# Patient Record
Sex: FEMALE | Race: WHITE | Employment: FULL TIME | URBAN - METROPOLITAN AREA
[De-identification: names, ages, dates, MRNs, and addresses within clinical notes are randomized per-mention and may not be internally consistent; named-entity substitution may affect disease eponyms.]

---

## 2017-03-14 ENCOUNTER — HOSPITAL ENCOUNTER (OUTPATIENT)
Age: 42
Discharge: HOME | End: 2017-03-14
Attending: EMERGENCY MEDICINE
Payer: COMMERCIAL

## 2017-03-14 VITALS
DIASTOLIC BLOOD PRESSURE: 89 MMHG | SYSTOLIC BLOOD PRESSURE: 140 MMHG | OXYGEN SATURATION: 99 % | TEMPERATURE: 98 F | HEART RATE: 77 BPM | RESPIRATION RATE: 18 BRPM | HEIGHT: 65 IN | WEIGHT: 190 LBS | BODY MASS INDEX: 31.65 KG/M2

## 2017-03-14 DIAGNOSIS — J40 BRONCHITIS: Primary | ICD-10-CM

## 2017-03-14 PROCEDURE — 99203 OFFICE O/P NEW LOW 30 MIN: CPT

## 2017-03-14 PROCEDURE — 99204 OFFICE O/P NEW MOD 45 MIN: CPT

## 2017-03-14 RX ORDER — CODEINE PHOSPHATE AND GUAIFENESIN 10; 100 MG/5ML; MG/5ML
5 SOLUTION ORAL EVERY 6 HOURS PRN
Qty: 100 ML | Refills: 0 | Status: SHIPPED | OUTPATIENT
Start: 2017-03-14 | End: 2017-11-06

## 2017-03-14 RX ORDER — ALBUTEROL SULFATE 90 UG/1
2 AEROSOL, METERED RESPIRATORY (INHALATION) EVERY 4 HOURS PRN
Qty: 1 INHALER | Refills: 0 | Status: SHIPPED | OUTPATIENT
Start: 2017-03-14 | End: 2017-04-13

## 2017-03-14 RX ORDER — AZITHROMYCIN 250 MG/1
TABLET, FILM COATED ORAL
Qty: 1 PACKAGE | Refills: 0 | Status: SHIPPED | OUTPATIENT
Start: 2017-03-14 | End: 2017-11-06 | Stop reason: ALTCHOICE

## 2017-03-14 RX ORDER — GUAIFENESIN 600 MG
1200 TABLET, EXTENDED RELEASE 12 HR ORAL 2 TIMES DAILY
COMMUNITY
End: 2017-11-06

## 2017-03-14 NOTE — ED PROVIDER NOTES
CC:Cough    HPI:     Eliezer Smith is a 37year old female who presents for evaluation of a chief complaint of a cough. Onset of symptoms was over 10 days ago.  The patient also complains of having abnormal breath sounds which are audible and being unabl Inhalation Aero Soln   Sig: Inhale 2 puffs into the lungs every 4 (four) hours as needed for Wheezing or Shortness of Breath.    Dispense:  1 Inhaler   Refill:  0  Spacer/Aero Chamber Mouthpiece Does not apply Misc   Sig: Use with inhaler   Dispense:  1 eac

## 2017-03-14 NOTE — ED INITIAL ASSESSMENT (HPI)
Pt presents to the IC with c/o cough for approx 10-12 days. Pt notes \"i don't feel sick\". No SOB, fever, nasal congestion, sore throat or ear pain. Non-productive, dry cough. Pt notes her  was just sick and now she has the same.  +difficulty sleepi

## 2017-03-15 PROBLEM — D25.9 UTERINE LEIOMYOMA, UNSPECIFIED LOCATION: Status: ACTIVE | Noted: 2017-03-15

## 2017-11-06 PROCEDURE — 88175 CYTOPATH C/V AUTO FLUID REDO: CPT | Performed by: OBSTETRICS & GYNECOLOGY

## 2017-11-06 PROCEDURE — 87624 HPV HI-RISK TYP POOLED RSLT: CPT | Performed by: OBSTETRICS & GYNECOLOGY

## 2019-01-07 ENCOUNTER — HOSPITAL ENCOUNTER (OUTPATIENT)
Age: 44
Discharge: HOME | End: 2019-01-07
Attending: EMERGENCY MEDICINE
Payer: COMMERCIAL

## 2019-01-07 VITALS
TEMPERATURE: 98 F | DIASTOLIC BLOOD PRESSURE: 77 MMHG | RESPIRATION RATE: 20 BRPM | HEART RATE: 86 BPM | OXYGEN SATURATION: 100 % | HEIGHT: 65 IN | BODY MASS INDEX: 32.49 KG/M2 | WEIGHT: 195 LBS | SYSTOLIC BLOOD PRESSURE: 145 MMHG

## 2019-01-07 DIAGNOSIS — J40 BRONCHITIS: Primary | ICD-10-CM

## 2019-01-07 PROCEDURE — 99213 OFFICE O/P EST LOW 20 MIN: CPT

## 2019-01-07 PROCEDURE — 99214 OFFICE O/P EST MOD 30 MIN: CPT

## 2019-01-07 RX ORDER — BENZONATATE 100 MG/1
100 CAPSULE ORAL 3 TIMES DAILY PRN
Qty: 30 CAPSULE | Refills: 0 | Status: SHIPPED | OUTPATIENT
Start: 2019-01-07 | End: 2019-02-06

## 2019-01-07 RX ORDER — FLUTICASONE PROPIONATE 50 MCG
2 SPRAY, SUSPENSION (ML) NASAL DAILY
Qty: 16 G | Refills: 0 | Status: SHIPPED | OUTPATIENT
Start: 2019-01-07 | End: 2019-02-06

## 2019-01-07 RX ORDER — ALBUTEROL SULFATE 90 UG/1
2 AEROSOL, METERED RESPIRATORY (INHALATION) EVERY 4 HOURS PRN
Qty: 1 INHALER | Refills: 0 | Status: SHIPPED | OUTPATIENT
Start: 2019-01-07 | End: 2019-02-06

## 2019-01-07 NOTE — ED INITIAL ASSESSMENT (HPI)
Patient is here with a congested cough that she states has been going on for the last 10 days. She states it keeps her up at night.

## 2019-04-03 ENCOUNTER — OFFICE VISIT (OUTPATIENT)
Dept: UROGYNECOLOGY | Age: 44
End: 2019-04-03

## 2019-04-03 DIAGNOSIS — D25.2 INTRAMURAL AND SUBSEROUS LEIOMYOMA OF UTERUS: ICD-10-CM

## 2019-04-03 DIAGNOSIS — R35.0 FREQUENCY OF URINATION: Primary | ICD-10-CM

## 2019-04-03 DIAGNOSIS — R10.2 PELVIC PAIN IN FEMALE: ICD-10-CM

## 2019-04-03 DIAGNOSIS — D25.1 INTRAMURAL AND SUBSEROUS LEIOMYOMA OF UTERUS: ICD-10-CM

## 2019-04-03 LAB
APPEARANCE, POC: CLEAR
BILIRUBIN, POC: NEGATIVE
COLOR, POC: YELLOW
GLUCOSE UR-MCNC: NEGATIVE MG/DL
KETONES, POC: NEGATIVE
NITRITE, POC: NEGATIVE
OCCULT BLOOD, POC: NEGATIVE
PH UR: 5.5 [PH] (ref 5–7)
PROT UR-MCNC: NEGATIVE G/DL
SP GR UR: 1.02 (ref 1–1.03)
UROBILINOGEN UR-MCNC: 0.2 MG/DL (ref 0–1)
WBC (LEUKOCYTE) ESTERASE, POC: NORMAL

## 2019-04-03 PROCEDURE — 76856 US EXAM PELVIC COMPLETE: CPT | Performed by: OBSTETRICS & GYNECOLOGY

## 2019-04-03 PROCEDURE — 99204 OFFICE O/P NEW MOD 45 MIN: CPT | Performed by: OBSTETRICS & GYNECOLOGY

## 2019-04-03 PROCEDURE — 76830 TRANSVAGINAL US NON-OB: CPT | Performed by: OBSTETRICS & GYNECOLOGY

## 2019-04-03 ASSESSMENT — PAIN SCALES - GENERAL: PAINLEVEL: 0

## 2019-04-05 ENCOUNTER — OFF PREMISE (OUTPATIENT)
Dept: UROGYNECOLOGY | Age: 44
End: 2019-04-05

## 2019-05-07 ENCOUNTER — HOSPITAL ENCOUNTER (OUTPATIENT)
Age: 44
Discharge: HOME | End: 2019-05-07
Attending: EMERGENCY MEDICINE
Payer: COMMERCIAL

## 2019-05-07 VITALS
SYSTOLIC BLOOD PRESSURE: 140 MMHG | OXYGEN SATURATION: 99 % | HEART RATE: 68 BPM | TEMPERATURE: 98 F | RESPIRATION RATE: 16 BRPM | DIASTOLIC BLOOD PRESSURE: 72 MMHG

## 2019-05-07 DIAGNOSIS — B34.9 VIRAL ILLNESS: Primary | ICD-10-CM

## 2019-05-07 PROCEDURE — 99212 OFFICE O/P EST SF 10 MIN: CPT

## 2019-05-07 PROCEDURE — 99213 OFFICE O/P EST LOW 20 MIN: CPT

## 2019-05-07 NOTE — ED INITIAL ASSESSMENT (HPI)
Pt presents to the IC with c/o headaches, stiff back, body aches, and chills. No known fevers. Pt has tried OTC medications without relief. Pt notes high amounts of stress.

## 2019-05-07 NOTE — ED PROVIDER NOTES
Patient Seen in: [address]    History   Patient presents with:  Cough/URI    Stated Complaint: bodyaches, chills    HPI    Patient is a healthy 43-year-old female who presents to immediate care complaining of 2 days of Neurological: Positive for light-headedness. Negative for dizziness, weakness and numbness. Positive for stated complaint: bodyaches, chills  Other systems are as noted in HPI. Constitutional and vital signs reviewed.       All other systems review

## 2019-05-15 ENCOUNTER — APPOINTMENT (OUTPATIENT)
Dept: CT IMAGING | Facility: HOSPITAL | Age: 44
End: 2019-05-15
Attending: EMERGENCY MEDICINE
Payer: COMMERCIAL

## 2019-05-15 ENCOUNTER — HOSPITAL ENCOUNTER (EMERGENCY)
Facility: HOSPITAL | Age: 44
Discharge: HOME | End: 2019-05-15
Attending: EMERGENCY MEDICINE
Payer: COMMERCIAL

## 2019-05-15 ENCOUNTER — HOSPITAL ENCOUNTER (OUTPATIENT)
Age: 44
Discharge: ANOTHER HOSPITAL | End: 2019-05-15
Attending: EMERGENCY MEDICINE
Payer: COMMERCIAL

## 2019-05-15 VITALS
SYSTOLIC BLOOD PRESSURE: 125 MMHG | RESPIRATION RATE: 18 BRPM | HEART RATE: 85 BPM | DIASTOLIC BLOOD PRESSURE: 71 MMHG | TEMPERATURE: 98 F | OXYGEN SATURATION: 98 %

## 2019-05-15 VITALS
HEART RATE: 88 BPM | RESPIRATION RATE: 20 BRPM | WEIGHT: 198 LBS | DIASTOLIC BLOOD PRESSURE: 66 MMHG | SYSTOLIC BLOOD PRESSURE: 127 MMHG | HEIGHT: 65 IN | BODY MASS INDEX: 32.99 KG/M2 | OXYGEN SATURATION: 99 % | TEMPERATURE: 99 F

## 2019-05-15 DIAGNOSIS — R10.9 ABDOMINAL PAIN OF UNKNOWN ETIOLOGY: Primary | ICD-10-CM

## 2019-05-15 DIAGNOSIS — R10.31 RLQ ABDOMINAL PAIN: Primary | ICD-10-CM

## 2019-05-15 DIAGNOSIS — D72.829 LEUKOCYTOSIS, UNSPECIFIED TYPE: ICD-10-CM

## 2019-05-15 DIAGNOSIS — N83.201 CYST OF RIGHT OVARY: ICD-10-CM

## 2019-05-15 DIAGNOSIS — K76.9 LIVER LESION: ICD-10-CM

## 2019-05-15 PROCEDURE — 99212 OFFICE O/P EST SF 10 MIN: CPT

## 2019-05-15 PROCEDURE — 74177 CT ABD & PELVIS W/CONTRAST: CPT | Performed by: EMERGENCY MEDICINE

## 2019-05-15 PROCEDURE — 81001 URINALYSIS AUTO W/SCOPE: CPT | Performed by: EMERGENCY MEDICINE

## 2019-05-15 PROCEDURE — 81025 URINE PREGNANCY TEST: CPT

## 2019-05-15 PROCEDURE — 96360 HYDRATION IV INFUSION INIT: CPT

## 2019-05-15 PROCEDURE — 80048 BASIC METABOLIC PNL TOTAL CA: CPT | Performed by: EMERGENCY MEDICINE

## 2019-05-15 PROCEDURE — 85025 COMPLETE CBC W/AUTO DIFF WBC: CPT | Performed by: EMERGENCY MEDICINE

## 2019-05-15 PROCEDURE — 99284 EMERGENCY DEPT VISIT MOD MDM: CPT

## 2019-05-15 NOTE — ED INITIAL ASSESSMENT (HPI)
Pt presents to the IC with c/o acute onset of RLQ abd pain at 0930. Pt notes nausea without emesis. +diarrhea. Pt was seen last week after having n/v for 3 days and dx with gastroenteritis. No fevers today.

## 2019-05-15 NOTE — ED PROVIDER NOTES
Patient Seen in: [address]    History   Patient presents with:  Abdomen/Flank Pain (GI/)    Stated Complaint: appendix    HPI    Patient complains of RLQ PAIN abdominal pain that began 10AM.  Pain described as stabbin Current:/66   Pulse 88   Temp 99.2 °F (37.3 °C) (Oral)   Resp 20   Ht 165.1 cm (5' 5\")   Wt 89.8 kg   LMP 04/23/2019   SpO2 99%   BMI 32.95 kg/m²   Pulse ox         Physical Exam  General Appearance: alert, no distress  Eyes: pupils equal and

## 2019-05-16 NOTE — ED NOTES
Discharge instructions given to patient. Patient states understanding. Patient alert and oriented leaving with family. Instructed patient to follow up with MD listed on discharge papers, or return to ED if symptoms worsen.

## 2019-05-16 NOTE — ED NOTES
Patient c/o of lower right side of abdomen and radiates to lower back. Patient states had soft bowel movement this morning does not feel it was diarrhea. Patient appears comfort while in bed, but states may have shooting pain. Also pain upon palpation.  Luz

## 2019-05-16 NOTE — ED PROVIDER NOTES
Patient Seen in: [facility] AND [facility]    History   Patient presents with:  Abdomen/Flank Pain (GI/)    Stated Complaint: abd pain    HPI    81-year-old female presents for complaint of right lower quadrant abdominal pain since this mo Temp src Oral   SpO2 100 %   O2 Device None (Room air)       Current:/71   Pulse 95   Temp 98.1 °F (36.7 °C) (Oral)   Resp 16   LMP 04/23/2019   SpO2 98%         Physical Exam   Constitutional: She is oriented to person, place, and time.  She appear results for these tests on the individual orders. RAINBOW DRAW BLUE   RAINBOW DRAW LAVENDER   RAINBOW DRAW LIGHT GREEN   RAINBOW DRAW GOLD                MDM    CBC with leukocytosis. BMP unremarkable. Urinalysis without any evidence of infection.   CT represent cysts. No enhancing renal lesion or hydronephrosis. AORTA/VASCULAR:   Retroaortic left renal vein. No abdominal aortic aneurysm. RETROPERITONEUM: No mass or enlarged adenopathy. BOWEL/MESENTERY:  There is no small or large bowel obstruction.  Dameon Mooney or other hepatic focus. Nonemergent MRI  abdomen with and without contrast is recommended to further characterize this finding. 3.  Retroaortic left renal vein, normal anatomic variant.    Dictated by (CST): Hasmukh Hayes MD on 5/15/2019 at 21:09     Approv

## 2019-05-17 ENCOUNTER — OFFICE VISIT (OUTPATIENT)
Dept: INTERNAL MEDICINE CLINIC | Facility: CLINIC | Age: 44
End: 2019-05-17
Payer: COMMERCIAL

## 2019-05-17 VITALS
HEIGHT: 66 IN | WEIGHT: 207 LBS | BODY MASS INDEX: 33.27 KG/M2 | SYSTOLIC BLOOD PRESSURE: 130 MMHG | DIASTOLIC BLOOD PRESSURE: 82 MMHG | HEART RATE: 82 BPM

## 2019-05-17 DIAGNOSIS — K76.9 LIVER LESION, RIGHT LOBE: ICD-10-CM

## 2019-05-17 DIAGNOSIS — R10.31 RIGHT LOWER QUADRANT ABDOMINAL PAIN: Primary | ICD-10-CM

## 2019-05-17 PROCEDURE — 99212 OFFICE O/P EST SF 10 MIN: CPT | Performed by: INTERNAL MEDICINE

## 2019-05-17 PROCEDURE — 99202 OFFICE O/P NEW SF 15 MIN: CPT | Performed by: INTERNAL MEDICINE

## 2019-05-17 NOTE — PROGRESS NOTES
David Henry is a 44year old female. Patient presents with:  ER F/U: Was seen in [facility] ER on 5/15 due to lower right abdominal pain. HPI:   Ms. Bessie Foley presents this morning for ER follow-up.     On Wednesday, 2 days ago, she developed low-grade f Performed by Antonieta Cole MD at [place] [address]   • LASIK  2004   • OTHER SURGICAL HISTORY      billat knee arthroscopys more than 10 years/ right tennis elbow release 7 yrs ago/ lasix 2004   • OTHER SURGICAL HISTORY Right 12/2010    rightr elbow surg

## 2019-05-17 NOTE — PATIENT INSTRUCTIONS
Please schedule an MRI of your abdomen. Await results of pelvic ultrasound and follow-up with Gynecology.

## 2019-05-23 ENCOUNTER — TELEPHONE (OUTPATIENT)
Dept: OTHER | Age: 44
End: 2019-05-23

## 2019-05-23 NOTE — TELEPHONE ENCOUNTER
Pt would like to know if mri results received. Done on 5/21/19. LMTCB to see where Mri was done.   Please advise

## 2019-05-23 NOTE — TELEPHONE ENCOUNTER
Patient returned call and stated she completed MRI at [facility] in [facility]. [address] number for [facility] is [phone] per patient.

## 2019-05-24 NOTE — TELEPHONE ENCOUNTER
[facility] and requested for MRI report to [phone].   Rec'd report and placed on desk for review

## 2019-05-30 ENCOUNTER — TELEPHONE (OUTPATIENT)
Dept: GASTROENTEROLOGY | Facility: CLINIC | Age: 44
End: 2019-05-30

## 2019-05-30 ENCOUNTER — OFFICE VISIT (OUTPATIENT)
Dept: GASTROENTEROLOGY | Facility: CLINIC | Age: 44
End: 2019-05-30
Payer: COMMERCIAL

## 2019-05-30 VITALS
DIASTOLIC BLOOD PRESSURE: 87 MMHG | HEART RATE: 83 BPM | HEIGHT: 66 IN | BODY MASS INDEX: 33.05 KG/M2 | WEIGHT: 205.63 LBS | SYSTOLIC BLOOD PRESSURE: 124 MMHG

## 2019-05-30 DIAGNOSIS — R93.89 ABNORMAL FINDING ON CT SCAN: Primary | ICD-10-CM

## 2019-05-30 DIAGNOSIS — K76.9 LIVER LESION: ICD-10-CM

## 2019-05-30 PROCEDURE — 99203 OFFICE O/P NEW LOW 30 MIN: CPT | Performed by: NURSE PRACTITIONER

## 2019-05-30 PROCEDURE — 99212 OFFICE O/P EST SF 10 MIN: CPT | Performed by: NURSE PRACTITIONER

## 2019-05-30 NOTE — TELEPHONE ENCOUNTER
Pt in office with Natasha SALAS today, and brought CD of MRI abdomen done at [place] [address] in [facility] 5/21/19. Natasha Thompson requests that CD be taken to [facility] Rec file room to have scanned to computer, AND have our Radiologist read asap.   I spoke

## 2019-05-30 NOTE — H&P
[address] [facility] [address] Gastroenterology                                                                                                  Clinic History and Physical     Pa history of IBD.     Prior endoscopies:  Denies    Social Hx:  - No smoking  + Social Etoh  - Denies illicit drug use   - LMP: Perimenopausal  - Occupation:   - Lives with spouse  - NSAIDs/ASA use: PRN      History, Medications, Allergies, ROS: Blood pressure 124/87, pulse 83, height 5' 6\" (1.676 m), weight 205 lb 9.6 oz (93.3 kg).     Gen: patient appears comfortable and in no acute distress  HEENT: conjunctiva pink, the sclera appears anicteric, oropharynx clear, mucus membranes appear moist encounter. Meds This Visit:  Requested Prescriptions      No prescriptions requested or ordered in this encounter       Imaging & Referrals:  None       MARITZA Mcduffie  5/30/2019

## 2019-05-31 ENCOUNTER — MED REC SCAN ONLY (OUTPATIENT)
Dept: INTERNAL MEDICINE CLINIC | Facility: CLINIC | Age: 44
End: 2019-05-31

## 2019-06-05 ENCOUNTER — PATIENT MESSAGE (OUTPATIENT)
Dept: GASTROENTEROLOGY | Facility: CLINIC | Age: 44
End: 2019-06-05

## 2019-06-05 DIAGNOSIS — K76.9 LIVER LESION: Primary | ICD-10-CM

## 2019-06-05 NOTE — PROGRESS NOTES
Patient notified she can obtain MRI from contacting [phone] where she had imaging done in [facility]. Also patient states she already made appt with Dr Koko Mahoney at [place] and will have labs done tomorrow.      [id number]

## 2019-06-05 NOTE — TELEPHONE ENCOUNTER
From: Cam Perez  To: CHENCHO Mcclelland  Sent: 6/5/2019 4:06 PM CDT  Subject: Test Results Question    Wayne Elizalde,     Thank you for your detailed message today, much appreciated.  I have the referral names but not sure how to go about getting

## 2019-06-05 NOTE — TELEPHONE ENCOUNTER
From: Prudence Shall  To: CHENCHO Elliott  Sent: 6/5/2019 12:38 PM CDT  Subject: Visit Follow-up Question    Orestes De Oliveira-  Thank you for your time last week. Just a few update as we are waiting for the third-party MRI read/scan:   1.  My PCP reran

## 2019-06-05 NOTE — TELEPHONE ENCOUNTER
Pt called back and was given 2 drs name and which facility they work out of per [employer] and was transferred to medical records so she can obtain a copy of MRI

## 2019-06-05 NOTE — PROGRESS NOTES
Nursing: I left the patient a very detailed voicemail. I was able to review the outside MRI report that is scanned into the patient's chart.   This report indicates a indeterminant solid lesion in the right lobe of the liver that is not typical of a dmitry

## 2019-06-06 ENCOUNTER — APPOINTMENT (OUTPATIENT)
Dept: LAB | Facility: HOSPITAL | Age: 44
End: 2019-06-06
Attending: NURSE PRACTITIONER
Payer: COMMERCIAL

## 2019-06-06 DIAGNOSIS — K76.9 LIVER LESION: ICD-10-CM

## 2019-06-06 PROCEDURE — 82105 ALPHA-FETOPROTEIN SERUM: CPT

## 2019-06-06 PROCEDURE — 80076 HEPATIC FUNCTION PANEL: CPT

## 2019-06-06 PROCEDURE — 36415 COLL VENOUS BLD VENIPUNCTURE: CPT

## 2019-06-06 PROCEDURE — 82378 CARCINOEMBRYONIC ANTIGEN: CPT

## 2019-06-14 ENCOUNTER — TELEPHONE (OUTPATIENT)
Dept: GASTROENTEROLOGY | Facility: CLINIC | Age: 44
End: 2019-06-14

## 2019-06-24 NOTE — TELEPHONE ENCOUNTER
[address]-  We received note from Dr Migel Cruz these have been placed on your desk for review.

## 2019-06-25 NOTE — TELEPHONE ENCOUNTER
Noted and appreciated. I have reviewed Dr. Pace Bolus note. He plans to f/u w/ pt in 1 month, review imaging, possibly consider Fibroscan pending results.      OV notes sent to scanning

## 2019-06-26 ENCOUNTER — HOSPITAL (OUTPATIENT)
Dept: OTHER | Age: 44
End: 2019-06-26

## 2019-06-28 LAB — PATHOLOGIST NAME: NORMAL

## 2019-09-11 ENCOUNTER — HOSPITAL (OUTPATIENT)
Dept: OTHER | Age: 44
End: 2019-09-11
Attending: OBSTETRICS & GYNECOLOGY

## 2020-03-13 PROBLEM — O09.811 PREGNANCY RESULTING FROM ASSISTED REPRODUCTIVE TECHNOLOGY IN FIRST TRIMESTER: Status: ACTIVE | Noted: 2020-03-13

## 2020-04-22 ENCOUNTER — HOSPITAL ENCOUNTER (OUTPATIENT)
Dept: PERINATAL CARE | Facility: HOSPITAL | Age: 45
Discharge: HOME | End: 2020-04-22
Attending: OBSTETRICS & GYNECOLOGY
Payer: COMMERCIAL

## 2020-04-22 VITALS
HEART RATE: 97 BPM | WEIGHT: 197 LBS | DIASTOLIC BLOOD PRESSURE: 78 MMHG | BODY MASS INDEX: 33 KG/M2 | SYSTOLIC BLOOD PRESSURE: 126 MMHG

## 2020-04-22 DIAGNOSIS — O09.811 PREGNANCY RESULTING FROM ASSISTED REPRODUCTIVE TECHNOLOGY IN FIRST TRIMESTER: ICD-10-CM

## 2020-04-22 DIAGNOSIS — O09.521 AMA (ADVANCED MATERNAL AGE) MULTIGRAVIDA 35+, FIRST TRIMESTER: ICD-10-CM

## 2020-04-22 DIAGNOSIS — D25.9 UTERINE LEIOMYOMA, UNSPECIFIED LOCATION: ICD-10-CM

## 2020-04-22 DIAGNOSIS — Z36.9 FIRST TRIMESTER SCREENING: ICD-10-CM

## 2020-04-22 DIAGNOSIS — O09.811 PREGNANCY RESULTING FROM ASSISTED REPRODUCTIVE TECHNOLOGY IN FIRST TRIMESTER: Primary | ICD-10-CM

## 2020-04-22 PROBLEM — O09.529 AMA (ADVANCED MATERNAL AGE) MULTIGRAVIDA 35+: Status: ACTIVE | Noted: 2020-04-22

## 2020-04-22 PROCEDURE — 76813 OB US NUCHAL MEAS 1 GEST: CPT | Performed by: OBSTETRICS & GYNECOLOGY

## 2020-04-22 PROCEDURE — 99243 OFF/OP CNSLTJ NEW/EST LOW 30: CPT | Performed by: OBSTETRICS & GYNECOLOGY

## 2020-04-22 NOTE — PROGRESS NOTES
Reason for Consult:   Dear Dr. Anna Lopez,    Thank you for requesting ultrasound evaluation and maternal fetal medicine consultation on Chilo Dennis. As you are aware she is a 38year old female with a Link pregnancy at 12w3d.   A maternal-feta NARRATIVE:     /78   Pulse 97   Wt 197 lb (89.4 kg)   LMP 01/26/2020 (Exact Date)   BMI 32.78 kg/m²           Alert and Oriented. No acute distress          Abdomen:  soft, nontender, no contractions noted.            extremities:  nontender, no age or older are more likely to be delivered by .  The  delivery rate in the general obstetric population of the [facility] is almost 30 percent, compared to almost 48 percent in women over age 36 to 39 and almost [de-identified] percent in women age age at age 37year old. She understands that ultrasound exam cannot exclude potential genetic abnormalities. Her estimated risk based on maternal age at amniocentesis with any chromosome abnormality is about 1:20  and with Down Syndrome is about 1: 27. trimester prior to the onset of labor. Despite these concerns, the nine studies describing results of pregnancy following laparoscopic myomectomy reported only one uterine rupture in 211 deliveries.     I recommend elective  delivery prior to the on

## 2020-05-01 PROBLEM — Z14.1 CYSTIC FIBROSIS CARRIER: Status: ACTIVE | Noted: 2020-05-01

## 2020-05-19 PROBLEM — O09.512 AMA (ADVANCED MATERNAL AGE) PRIMIGRAVIDA 35+, SECOND TRIMESTER: Status: ACTIVE | Noted: 2020-04-22

## 2020-06-24 ENCOUNTER — HOSPITAL ENCOUNTER (OUTPATIENT)
Dept: PERINATAL CARE | Facility: HOSPITAL | Age: 45
Discharge: HOME | End: 2020-06-24
Attending: OBSTETRICS & GYNECOLOGY
Payer: COMMERCIAL

## 2020-06-24 VITALS
DIASTOLIC BLOOD PRESSURE: 67 MMHG | HEART RATE: 102 BPM | BODY MASS INDEX: 33 KG/M2 | WEIGHT: 201 LBS | SYSTOLIC BLOOD PRESSURE: 106 MMHG

## 2020-06-24 DIAGNOSIS — O09.512 AMA (ADVANCED MATERNAL AGE) PRIMIGRAVIDA 35+, SECOND TRIMESTER: Primary | ICD-10-CM

## 2020-06-24 DIAGNOSIS — D25.9 UTERINE LEIOMYOMA, UNSPECIFIED LOCATION: ICD-10-CM

## 2020-06-24 DIAGNOSIS — O09.512 AMA (ADVANCED MATERNAL AGE) PRIMIGRAVIDA 35+, SECOND TRIMESTER: ICD-10-CM

## 2020-06-24 PROCEDURE — 76811 OB US DETAILED SNGL FETUS: CPT | Performed by: OBSTETRICS & GYNECOLOGY

## 2020-06-24 PROCEDURE — 99214 OFFICE O/P EST MOD 30 MIN: CPT | Performed by: OBSTETRICS & GYNECOLOGY

## 2020-06-24 NOTE — PROGRESS NOTES
Reason for Consult:   Dear Dr. Quinn Hahn,    Thank you for requesting ultrasound evaluation and maternal fetal medicine consultation on Joby Laguerre. As you are aware she is a 38year old female with a Link pregnancy.   A maternal-fetal medicin Alcohol/week: 0.0 standard drinks      Comment: social    Drug use: No       NARRATIVE:     /67   Pulse 102   Wt 201 lb (91.2 kg)   LMP 01/26/2020 (Exact Date)   BMI 33.45 kg/m²           Alert and Oriented.   No acute distress          Abdomen:  s prior to the onset of labor IF the uterine cavity was entered during a prior myomectomy or IF a large number of myomas were removed. Even if the uterine cavity was not violated, the patient is likely to still have an increased risk of uterine rupture. 37-38 weeks due myomectomy    Thank you for allowing me to participate in the care of your patient. Please do not hesitate to call with any questions or concerns. Total patient time was 35 minutes in evaluation, consultation, and coordination of care.

## 2020-07-20 PROBLEM — Z98.891 HISTORY OF UTERINE SCAR DUE TO PREVIOUS SURGERY: Status: ACTIVE | Noted: 2017-03-15

## 2020-08-06 NOTE — PROGRESS NOTES
Ronal Frances    Dear Dr. Nagi Cotton    Thank you for requesting ultrasound evaluation and maternal fetal medicine consultation on your patient Laureen Duenas.   As you are aware she is a 38year old female  with a sing see previous MFM detailed discussion.       Background  I reviewed with the patient that pregnancies in women of advanced maternal age (28 or older at delivery) are associated with elevated risks.  Specifically, there is a higher rate of:  · Fetal malformat like us to manage her GDM, then please request our office to manage the GDM. Insulin therapy may be started depending on her blood sugar levels. IMPRESSION:  · IUP at 28w2d   · AMA- [place] test low risk.   · H/o myomectomy      RECOMMENDATIONS:

## 2020-08-11 ENCOUNTER — HOSPITAL ENCOUNTER (OUTPATIENT)
Dept: PERINATAL CARE | Facility: HOSPITAL | Age: 45
Discharge: HOME | End: 2020-08-11
Attending: OBSTETRICS & GYNECOLOGY
Payer: COMMERCIAL

## 2020-08-11 VITALS
SYSTOLIC BLOOD PRESSURE: 118 MMHG | WEIGHT: 204 LBS | DIASTOLIC BLOOD PRESSURE: 78 MMHG | HEART RATE: 97 BPM | BODY MASS INDEX: 34 KG/M2

## 2020-08-11 DIAGNOSIS — O09.512 AMA (ADVANCED MATERNAL AGE) PRIMIGRAVIDA 35+, SECOND TRIMESTER: ICD-10-CM

## 2020-08-11 DIAGNOSIS — O09.811 PREGNANCY RESULTING FROM ASSISTED REPRODUCTIVE TECHNOLOGY IN FIRST TRIMESTER: ICD-10-CM

## 2020-08-11 DIAGNOSIS — O09.811 PREGNANCY RESULTING FROM ASSISTED REPRODUCTIVE TECHNOLOGY IN FIRST TRIMESTER: Primary | ICD-10-CM

## 2020-08-11 DIAGNOSIS — O24.410 DIET CONTROLLED GESTATIONAL DIABETES MELLITUS (GDM) IN THIRD TRIMESTER: ICD-10-CM

## 2020-08-11 DIAGNOSIS — O34.29 PREGNANCY WITH HISTORY OF UTERINE MYOMECTOMY: ICD-10-CM

## 2020-08-11 PROCEDURE — 76816 OB US FOLLOW-UP PER FETUS: CPT | Performed by: OBSTETRICS & GYNECOLOGY

## 2020-08-11 PROCEDURE — 99213 OFFICE O/P EST LOW 20 MIN: CPT | Performed by: OBSTETRICS & GYNECOLOGY

## 2020-08-11 NOTE — PROGRESS NOTES
Pt for Growth US  HX FAILED 1 HOUR GTT PT TO DE  8/11/2020  Denies pregnancy complaints  States active fetus

## 2020-08-12 PROBLEM — O24.410 DIET CONTROLLED GESTATIONAL DIABETES MELLITUS (GDM) IN THIRD TRIMESTER: Status: ACTIVE | Noted: 2020-08-12

## 2020-08-19 PROBLEM — O09.523 SUPERVISION OF HIGH RISK ELDERLY MULTIGRAVIDA IN THIRD TRIMESTER: Status: ACTIVE | Noted: 2020-03-13

## 2020-09-01 ENCOUNTER — HOSPITAL ENCOUNTER (OUTPATIENT)
Dept: PERINATAL CARE | Facility: HOSPITAL | Age: 45
Discharge: HOME | End: 2020-09-01
Attending: OBSTETRICS & GYNECOLOGY
Payer: COMMERCIAL

## 2020-09-01 VITALS
WEIGHT: 201 LBS | BODY MASS INDEX: 33 KG/M2 | HEART RATE: 103 BPM | SYSTOLIC BLOOD PRESSURE: 128 MMHG | DIASTOLIC BLOOD PRESSURE: 70 MMHG

## 2020-09-01 DIAGNOSIS — O09.512 AMA (ADVANCED MATERNAL AGE) PRIMIGRAVIDA 35+, SECOND TRIMESTER: ICD-10-CM

## 2020-09-01 DIAGNOSIS — O34.29 PREGNANCY WITH HISTORY OF UTERINE MYOMECTOMY: ICD-10-CM

## 2020-09-01 DIAGNOSIS — O24.410 DIET CONTROLLED GESTATIONAL DIABETES MELLITUS (GDM) IN THIRD TRIMESTER: ICD-10-CM

## 2020-09-01 PROCEDURE — 99215 OFFICE O/P EST HI 40 MIN: CPT | Performed by: OBSTETRICS & GYNECOLOGY

## 2020-09-01 NOTE — PROGRESS NOTES
Reason for Consult:   Dear Dr. Anitha Werner,    Thank you for requesting maternal fetal medicine consultation on Sara Iraheta. As you are aware she is a 41year old female with a Link pregnancy.   A maternal-fetal medicine consultation was request yrs ago/ lasix 2004   • OTHER SURGICAL HISTORY Right 12/2010    rightr elbow surgery    • OTHER SURGICAL HISTORY  2019    laparoscopic myomectomy-Dr Isi Brooks   • UTERINE FIBROID EMBOLIZATION PERQ W/RAD GID  05/31/2017      Family History   Problem Relation A trimester; NST weekly by about 32 weeks and increase to twice weekly by 36 weeks. She will be testing her blood sugars at fasting and 2 hour postprandially. Fasting blood glucose should be less than 95 and two hour postprandial <120.

## 2020-09-04 DIAGNOSIS — O24.419 GESTATIONAL DIABETES MELLITUS: Primary | ICD-10-CM

## 2020-09-09 ENCOUNTER — HOSPITAL ENCOUNTER (OUTPATIENT)
Dept: PERINATAL CARE | Facility: HOSPITAL | Age: 45
Discharge: HOME | End: 2020-09-09
Attending: OBSTETRICS & GYNECOLOGY
Payer: COMMERCIAL

## 2020-09-09 VITALS
HEART RATE: 110 BPM | WEIGHT: 201 LBS | SYSTOLIC BLOOD PRESSURE: 129 MMHG | BODY MASS INDEX: 33 KG/M2 | DIASTOLIC BLOOD PRESSURE: 81 MMHG

## 2020-09-09 DIAGNOSIS — O24.410 DIET CONTROLLED GESTATIONAL DIABETES MELLITUS (GDM) IN THIRD TRIMESTER: ICD-10-CM

## 2020-09-09 DIAGNOSIS — O09.523 AMA (ADVANCED MATERNAL AGE) MULTIGRAVIDA 35+, THIRD TRIMESTER: ICD-10-CM

## 2020-09-09 DIAGNOSIS — O09.523 AMA (ADVANCED MATERNAL AGE) MULTIGRAVIDA 35+, THIRD TRIMESTER: Primary | ICD-10-CM

## 2020-09-09 DIAGNOSIS — O34.29 PREGNANCY WITH HISTORY OF UTERINE MYOMECTOMY: ICD-10-CM

## 2020-09-09 DIAGNOSIS — Z14.1 CYSTIC FIBROSIS CARRIER: ICD-10-CM

## 2020-09-09 PROCEDURE — 99214 OFFICE O/P EST MOD 30 MIN: CPT | Performed by: OBSTETRICS & GYNECOLOGY

## 2020-09-09 PROCEDURE — 76819 FETAL BIOPHYS PROFIL W/O NST: CPT | Performed by: OBSTETRICS & GYNECOLOGY

## 2020-09-09 PROCEDURE — 76816 OB US FOLLOW-UP PER FETUS: CPT | Performed by: OBSTETRICS & GYNECOLOGY

## 2020-09-09 NOTE — PROGRESS NOTES
Reason for Consult:   Dear Dr. Sonal Cespedes,    Thank you for requesting maternal fetal medicine consultation and ultrasound on Chrissy Bunn. As you are aware she is a 41year old female with a Link pregnancy.   A maternal-fetal medicine consultat elbow release 7 yrs ago/ lasix 2004   • OTHER SURGICAL HISTORY Right 12/2010    rightr elbow surgery    • OTHER SURGICAL HISTORY  2019    laparoscopic myomectomy-Dr Zenon Xiao   • UTERINE FIBROID EMBOLIZATION PERQ W/RAD GID  05/31/2017      Family History   Pr or older at delivery) are associated with elevated risks.  Specifically, there is a higher rate of:  · Fetal malformations  · Preeclampsia  · Gestational diabetes  · Intrauterine fetal death        OB ULTRASOUND REPORT   See imaging tab for complete ultraso

## 2020-09-11 ENCOUNTER — TELEPHONE (OUTPATIENT)
Dept: PERINATAL CARE | Facility: HOSPITAL | Age: 45
End: 2020-09-11

## 2020-09-11 NOTE — TELEPHONE ENCOUNTER
Blood Glucose 9/5-9/11    Fasting    4 of  7 out of range    AB           2 of 7 out of range    AL           1 of 6 out of range    AD         106-132   2 of 6 out of range    No meds

## 2020-09-11 NOTE — TELEPHONE ENCOUNTER
Ensure that patient is taking HS snack. If persistently high fasting blood glucose values next week consider metformin 500 mg p.o. nightly    Kellie Mcnair. Khris Winkler M.D.   Maternal-Fetal Medicine

## 2020-09-11 NOTE — TELEPHONE ENCOUNTER
Blood Sugar Log   Reviewed by Dr Tamika Stone    Review 9/18 if Fasting numbers are still high consider Metformin 500mg at HS  Pt reminded of importance of evening snack    No changes in management

## 2020-09-18 ENCOUNTER — TELEPHONE (OUTPATIENT)
Dept: PERINATAL CARE | Facility: HOSPITAL | Age: 45
End: 2020-09-18

## 2020-09-18 NOTE — TELEPHONE ENCOUNTER
Blood Glucose 9/11-9/18    Fasting   88-95   0/7 out of range    AB           1/7  out of range    AL          2/7  out of range    AD       105-44    5/7  out of range     diet control  Dr Andrez Amaya recommended metformin last week if fastings did

## 2020-09-30 ENCOUNTER — TELEPHONE (OUTPATIENT)
Dept: PERINATAL CARE | Facility: HOSPITAL | Age: 45
End: 2020-09-30

## 2020-09-30 PROBLEM — O24.415 GESTATIONAL DIABETES MELLITUS (GDM) CONTROLLED ON ORAL HYPOGLYCEMIC DRUG: Status: ACTIVE | Noted: 2020-09-30

## 2020-09-30 NOTE — TELEPHONE ENCOUNTER
Blood Glucose 9/24-9/30    Fasting        83-96   1/6 out of range    AB            0/6   out of range    AL         104-127   1/6  out of range    AD      115-146  3/6  out of range ([phone])    Taking     Glyburide 1.25mg daily with dinner

## 2020-09-30 NOTE — PROGRESS NOTES
Cande Robertson    Dear Dr. Shikha Cisneros    Thank you for requesting ultrasound evaluation and maternal fetal medicine consultation on your patient Ced Tyler.   As you are aware she is a 41year old female  with a sing ultrasound evidence of markers for aneuploidy are seen. She understands that ultrasound exam cannot exclude genetic abnormalities and that genetic testing is recommended. The limitations of ultrasound were discussed.      See PACS/Imaging Tab For Complete U

## 2020-09-30 NOTE — TELEPHONE ENCOUNTER
Blood Sugar Log  9/25-9/30  Reviewed by Dr Courtney Pisano    No changes in management    Continue Glyburide 1.25 daily with dinner

## 2020-10-05 ENCOUNTER — HOSPITAL ENCOUNTER (OUTPATIENT)
Dept: PERINATAL CARE | Facility: HOSPITAL | Age: 45
Discharge: HOME | End: 2020-10-05
Attending: OBSTETRICS & GYNECOLOGY
Payer: COMMERCIAL

## 2020-10-05 VITALS
BODY MASS INDEX: 34 KG/M2 | DIASTOLIC BLOOD PRESSURE: 75 MMHG | HEART RATE: 88 BPM | SYSTOLIC BLOOD PRESSURE: 126 MMHG | WEIGHT: 202 LBS

## 2020-10-05 DIAGNOSIS — Z98.891 HISTORY OF UTERINE SCAR DUE TO PREVIOUS SURGERY: ICD-10-CM

## 2020-10-05 DIAGNOSIS — O09.523 AMA (ADVANCED MATERNAL AGE) MULTIGRAVIDA 35+, THIRD TRIMESTER: Primary | ICD-10-CM

## 2020-10-05 DIAGNOSIS — O24.410 DIET CONTROLLED GESTATIONAL DIABETES MELLITUS (GDM) IN THIRD TRIMESTER: ICD-10-CM

## 2020-10-05 DIAGNOSIS — O24.415 GESTATIONAL DIABETES MELLITUS (GDM) IN THIRD TRIMESTER CONTROLLED ON ORAL HYPOGLYCEMIC DRUG: ICD-10-CM

## 2020-10-05 DIAGNOSIS — O09.523 AMA (ADVANCED MATERNAL AGE) MULTIGRAVIDA 35+, THIRD TRIMESTER: ICD-10-CM

## 2020-10-05 PROCEDURE — 99213 OFFICE O/P EST LOW 20 MIN: CPT | Performed by: OBSTETRICS & GYNECOLOGY

## 2020-10-05 PROCEDURE — 76819 FETAL BIOPHYS PROFIL W/O NST: CPT | Performed by: OBSTETRICS & GYNECOLOGY

## 2020-10-05 PROCEDURE — 76816 OB US FOLLOW-UP PER FETUS: CPT | Performed by: OBSTETRICS & GYNECOLOGY

## 2020-10-06 PROBLEM — O24.415 GESTATIONAL DIABETES MELLITUS (GDM) CONTROLLED ON ORAL HYPOGLYCEMIC DRUG: Status: RESOLVED | Noted: 2020-09-30 | Resolved: 2020-10-06

## 2020-10-06 PROBLEM — O24.415 GESTATIONAL DIABETES MELLITUS (GDM) IN THIRD TRIMESTER CONTROLLED ON ORAL HYPOGLYCEMIC DRUG: Status: ACTIVE | Noted: 2020-08-12

## 2020-10-07 ENCOUNTER — TELEPHONE (OUTPATIENT)
Dept: PERINATAL CARE | Facility: HOSPITAL | Age: 45
End: 2020-10-07

## 2020-10-07 NOTE — TELEPHONE ENCOUNTER
Blood Glucose 10/1-10/6    Fasting   86-92   0/6 out of range    AB          101-120  0/6  out of range    AL          107-129  2/5 out of range    AD          107-124  2/5  out of range    Taking    Glyburide 1.25 daily with dinner    Pt planned C/S  10/1

## 2020-10-07 NOTE — TELEPHONE ENCOUNTER
Blood Sugar Log   Reviewed by Dr Tabitha Rockwell    No changes in management    Continue Glyburide 1.25mg daily with dinner

## 2020-10-13 PROBLEM — Z14.1 CYSTIC FIBROSIS CARRIER: Status: RESOLVED | Noted: 2020-05-01 | Resolved: 2020-10-12

## 2020-10-13 PROBLEM — Z98.891 HISTORY OF UTERINE SCAR DUE TO PREVIOUS SURGERY: Status: RESOLVED | Noted: 2017-03-15 | Resolved: 2020-10-12

## 2020-10-13 PROBLEM — O24.415 GESTATIONAL DIABETES MELLITUS (GDM) IN THIRD TRIMESTER CONTROLLED ON ORAL HYPOGLYCEMIC DRUG: Status: RESOLVED | Noted: 2020-08-12 | Resolved: 2020-10-12

## 2020-10-13 PROBLEM — O09.512 AMA (ADVANCED MATERNAL AGE) PRIMIGRAVIDA 35+, SECOND TRIMESTER: Status: RESOLVED | Noted: 2020-04-22 | Resolved: 2020-10-12

## 2020-10-13 PROBLEM — O09.523 SUPERVISION OF HIGH RISK ELDERLY MULTIGRAVIDA IN THIRD TRIMESTER: Status: RESOLVED | Noted: 2020-03-13 | Resolved: 2020-10-12

## 2020-10-23 ENCOUNTER — TELEPHONE (OUTPATIENT)
Dept: PERINATAL CARE | Facility: HOSPITAL | Age: 45
End: 2020-10-23

## 2021-05-26 VITALS
RESPIRATION RATE: 16 BRPM | TEMPERATURE: 98.1 F | BODY MASS INDEX: 33.82 KG/M2 | DIASTOLIC BLOOD PRESSURE: 85 MMHG | SYSTOLIC BLOOD PRESSURE: 130 MMHG | HEART RATE: 88 BPM | WEIGHT: 203 LBS | HEIGHT: 65 IN

## 2021-05-29 ENCOUNTER — RECORDS - HEALTHEAST (OUTPATIENT)
Dept: ADMINISTRATIVE | Facility: CLINIC | Age: 46
End: 2021-05-29

## 2021-05-30 ENCOUNTER — RECORDS - HEALTHEAST (OUTPATIENT)
Dept: ADMINISTRATIVE | Facility: CLINIC | Age: 46
End: 2021-05-30

## 2021-11-29 NOTE — TELEPHONE ENCOUNTER
Blood Sugar Log   Reviewed by Dr Golden Chamorro    Begin  Metformin 250mg at [address] understanding RX ordered to pt pharmacy Wound care to left and right leg wounds:  1.  Cleanse wound with acetic acid.  2.  Apply a thin layer of gentamyocin to wound bed.  3.  Apply ABD rolled gauze as cover dressing.  4. Secure with tape.  5.  Change twice daily.       Please  your prescriptions today.    Please work on increasing leg elevations.

## 2022-07-19 ENCOUNTER — HOSPITAL ENCOUNTER (OUTPATIENT)
Dept: MAMMOGRAPHY | Facility: CLINIC | Age: 47
Discharge: HOME | End: 2022-07-19
Attending: OBSTETRICS & GYNECOLOGY | Admitting: OBSTETRICS & GYNECOLOGY
Payer: COMMERCIAL

## 2022-07-19 DIAGNOSIS — Z12.31 VISIT FOR SCREENING MAMMOGRAM: ICD-10-CM

## 2022-07-19 PROCEDURE — 77067 SCR MAMMO BI INCL CAD: CPT

## 2022-07-24 ENCOUNTER — HEALTH MAINTENANCE LETTER (OUTPATIENT)
Age: 47
End: 2022-07-24

## 2022-10-02 ENCOUNTER — HEALTH MAINTENANCE LETTER (OUTPATIENT)
Age: 47
End: 2022-10-02

## 2023-06-08 ENCOUNTER — HOSPITAL ENCOUNTER (EMERGENCY)
Facility: CLINIC | Age: 48
Discharge: HOME | End: 2023-06-08
Attending: EMERGENCY MEDICINE | Admitting: EMERGENCY MEDICINE
Payer: COMMERCIAL

## 2023-06-08 VITALS
HEART RATE: 91 BPM | TEMPERATURE: 98.8 F | DIASTOLIC BLOOD PRESSURE: 69 MMHG | SYSTOLIC BLOOD PRESSURE: 129 MMHG | RESPIRATION RATE: 16 BRPM | OXYGEN SATURATION: 98 %

## 2023-06-08 DIAGNOSIS — B34.9 VIRAL INFECTION: ICD-10-CM

## 2023-06-08 LAB
ALBUMIN SERPL BCG-MCNC: 3.9 G/DL (ref 3.5–5.2)
ALBUMIN UR-MCNC: NEGATIVE MG/DL
ALP SERPL-CCNC: 76 U/L (ref 35–104)
ALT SERPL W P-5'-P-CCNC: 17 U/L (ref 10–35)
ANION GAP SERPL CALCULATED.3IONS-SCNC: 15 MMOL/L (ref 7–15)
APPEARANCE UR: CLEAR
AST SERPL W P-5'-P-CCNC: 19 U/L (ref 10–35)
ATRIAL RATE - MUSE: 96 BPM
BASOPHILS # BLD AUTO: 0 10E3/UL (ref 0–0.2)
BASOPHILS NFR BLD AUTO: 0 %
BILIRUB DIRECT SERPL-MCNC: <0.2 MG/DL (ref 0–0.3)
BILIRUB SERPL-MCNC: 0.3 MG/DL
BILIRUB UR QL STRIP: NEGATIVE
BUN SERPL-MCNC: 4.4 MG/DL (ref 6–20)
CALCIUM SERPL-MCNC: 8.6 MG/DL (ref 8.6–10)
CHLORIDE SERPL-SCNC: 99 MMOL/L (ref 98–107)
COLOR UR AUTO: ABNORMAL
CREAT SERPL-MCNC: 0.62 MG/DL (ref 0.51–0.95)
DEPRECATED HCO3 PLAS-SCNC: 21 MMOL/L (ref 22–29)
DIASTOLIC BLOOD PRESSURE - MUSE: NORMAL MMHG
EOSINOPHIL # BLD AUTO: 0 10E3/UL (ref 0–0.7)
EOSINOPHIL NFR BLD AUTO: 0 %
ERYTHROCYTE [DISTWIDTH] IN BLOOD BY AUTOMATED COUNT: 13.2 % (ref 10–15)
FLUAV RNA SPEC QL NAA+PROBE: NEGATIVE
FLUBV RNA RESP QL NAA+PROBE: NEGATIVE
GFR SERPL CREATININE-BSD FRML MDRD: >90 ML/MIN/1.73M2
GLUCOSE SERPL-MCNC: 138 MG/DL (ref 70–99)
GLUCOSE UR STRIP-MCNC: NEGATIVE MG/DL
GROUP A STREP BY PCR: NOT DETECTED
HCT VFR BLD AUTO: 37.1 % (ref 35–47)
HGB BLD-MCNC: 12.1 G/DL (ref 11.7–15.7)
HGB UR QL STRIP: NEGATIVE
HOLD SPECIMEN: NORMAL
HOLD SPECIMEN: NORMAL
IMM GRANULOCYTES # BLD: 0.1 10E3/UL
IMM GRANULOCYTES NFR BLD: 1 %
INTERPRETATION ECG - MUSE: NORMAL
KETONES UR STRIP-MCNC: NEGATIVE MG/DL
LEUKOCYTE ESTERASE UR QL STRIP: NEGATIVE
LYMPHOCYTES # BLD AUTO: 2 10E3/UL (ref 0.8–5.3)
LYMPHOCYTES NFR BLD AUTO: 16 %
MCH RBC QN AUTO: 27.4 PG (ref 26.5–33)
MCHC RBC AUTO-ENTMCNC: 32.6 G/DL (ref 31.5–36.5)
MCV RBC AUTO: 84 FL (ref 78–100)
MONOCYTES # BLD AUTO: 0.9 10E3/UL (ref 0–1.3)
MONOCYTES NFR BLD AUTO: 7 %
MUCOUS THREADS #/AREA URNS LPF: PRESENT /LPF
NEUTROPHILS # BLD AUTO: 9.6 10E3/UL (ref 1.6–8.3)
NEUTROPHILS NFR BLD AUTO: 76 %
NITRATE UR QL: NEGATIVE
NRBC # BLD AUTO: 0 10E3/UL
NRBC BLD AUTO-RTO: 0 /100
P AXIS - MUSE: 35 DEGREES
PH UR STRIP: 5.5 [PH] (ref 5–7)
PLATELET # BLD AUTO: 353 10E3/UL (ref 150–450)
POTASSIUM SERPL-SCNC: 4 MMOL/L (ref 3.4–5.3)
PR INTERVAL - MUSE: 150 MS
PROT SERPL-MCNC: 7.3 G/DL (ref 6.4–8.3)
QRS DURATION - MUSE: 82 MS
QT - MUSE: 362 MS
QTC - MUSE: 457 MS
R AXIS - MUSE: 30 DEGREES
RBC # BLD AUTO: 4.42 10E6/UL (ref 3.8–5.2)
RBC URINE: 1 /HPF
RSV RNA SPEC NAA+PROBE: NEGATIVE
SARS-COV-2 RNA RESP QL NAA+PROBE: NEGATIVE
SODIUM SERPL-SCNC: 135 MMOL/L (ref 136–145)
SP GR UR STRIP: 1.01 (ref 1–1.03)
SYSTOLIC BLOOD PRESSURE - MUSE: NORMAL MMHG
T AXIS - MUSE: 14 DEGREES
UROBILINOGEN UR STRIP-MCNC: NORMAL MG/DL
VENTRICULAR RATE- MUSE: 96 BPM
WBC # BLD AUTO: 12.6 10E3/UL (ref 4–11)
WBC URINE: <1 /HPF

## 2023-06-08 PROCEDURE — 99284 EMERGENCY DEPT VISIT MOD MDM: CPT | Mod: 25

## 2023-06-08 PROCEDURE — 250N000011 HC RX IP 250 OP 636: Performed by: EMERGENCY MEDICINE

## 2023-06-08 PROCEDURE — 250N000013 HC RX MED GY IP 250 OP 250 PS 637: Performed by: EMERGENCY MEDICINE

## 2023-06-08 PROCEDURE — 81001 URINALYSIS AUTO W/SCOPE: CPT | Performed by: EMERGENCY MEDICINE

## 2023-06-08 PROCEDURE — 258N000003 HC RX IP 258 OP 636: Performed by: EMERGENCY MEDICINE

## 2023-06-08 PROCEDURE — 82248 BILIRUBIN DIRECT: CPT | Performed by: EMERGENCY MEDICINE

## 2023-06-08 PROCEDURE — 36415 COLL VENOUS BLD VENIPUNCTURE: CPT | Performed by: EMERGENCY MEDICINE

## 2023-06-08 PROCEDURE — 93005 ELECTROCARDIOGRAM TRACING: CPT

## 2023-06-08 PROCEDURE — 96361 HYDRATE IV INFUSION ADD-ON: CPT

## 2023-06-08 PROCEDURE — 87637 SARSCOV2&INF A&B&RSV AMP PRB: CPT | Performed by: EMERGENCY MEDICINE

## 2023-06-08 PROCEDURE — 80053 COMPREHEN METABOLIC PANEL: CPT | Performed by: EMERGENCY MEDICINE

## 2023-06-08 PROCEDURE — 96374 THER/PROPH/DIAG INJ IV PUSH: CPT

## 2023-06-08 PROCEDURE — 85025 COMPLETE CBC W/AUTO DIFF WBC: CPT | Performed by: EMERGENCY MEDICINE

## 2023-06-08 PROCEDURE — 87651 STREP A DNA AMP PROBE: CPT | Performed by: EMERGENCY MEDICINE

## 2023-06-08 RX ORDER — MAGNESIUM HYDROXIDE/ALUMINUM HYDROXICE/SIMETHICONE 120; 1200; 1200 MG/30ML; MG/30ML; MG/30ML
15 SUSPENSION ORAL ONCE
Status: COMPLETED | OUTPATIENT
Start: 2023-06-08 | End: 2023-06-08

## 2023-06-08 RX ORDER — ONDANSETRON 2 MG/ML
4 INJECTION INTRAMUSCULAR; INTRAVENOUS EVERY 30 MIN PRN
Status: DISCONTINUED | OUTPATIENT
Start: 2023-06-08 | End: 2023-06-08 | Stop reason: HOSPADM

## 2023-06-08 RX ORDER — NORETHINDRONE ACETATE AND ETHINYL ESTRADIOL .02; 1 MG/1; MG/1
1 TABLET ORAL DAILY
COMMUNITY
Start: 2021-08-02

## 2023-06-08 RX ADMIN — ALUMINUM HYDROXIDE, MAGNESIUM HYDROXIDE, AND DIMETHICONE 15 ML: 200; 20; 200 SUSPENSION ORAL at 12:18

## 2023-06-08 RX ADMIN — SODIUM CHLORIDE 1000 ML: 9 INJECTION, SOLUTION INTRAVENOUS at 12:17

## 2023-06-08 RX ADMIN — ONDANSETRON 4 MG: 2 INJECTION INTRAMUSCULAR; INTRAVENOUS at 12:17

## 2023-06-08 ASSESSMENT — ACTIVITIES OF DAILY LIVING (ADL): ADLS_ACUITY_SCORE: 35

## 2023-06-08 NOTE — ED PROVIDER NOTES
History     Chief Complaint:  Fever and Nausea & Vomiting       The history is provided by the patient.      Chela Gleason is a 47 year old female with a recent diagnosis of small fiber neuropathy who presents with fever, nausea and vomiting. The patient reports that she started taking Gabapentin for gestational DM, but stopped taking it on Tuesday when her symptoms started. Her symptoms incude a fever ranging from , which was accompanied by generalized myalgias. She also has been experiencing insomnia for 4 days. She also mentions that her acid reflux has been worse as of late, which she is taking Prilosec for as needed (and has used the past few days).She endorses frequent loose stools, rhinorrhea, and intermittent nausea. She denies any abdominal cramping or cough. Her last A1C in February was 5.9. No recent travel.      Independent Historian:   None - Patient Only    Review of External Notes:   None    Medications:    Azithromycin  Benzonatate  Cefdinir  Celecoxib  Cephalexin  Cyclobenzaprine  Doxycycline monohydrate  Erythromycin  Gabapentin  Norethindrone   Ofloxacin  Prednison    Past Medical History:    Personal History Of Gestational Diabetes  Fasciitis Plantar  Claustrophobia   Hypovitaminosis D   Small fiber neuropathy   Cystic fibrosis   Claustrophobia   Tennis elbow   Ovarian cyst   Hyperopia  Regular astigmatism, bilateral  Hypothyroid  Genitourinary disease    Past Surgical History:    Knee arthroscopy  Repair peroneal tendons ankle  Lateral Epicondyle release   section  Myomectomy  Meniscectomy  Uterine fibroid embolization    Physical Exam     Patient Vitals for the past 24 hrs:   BP Temp Temp src Pulse Resp SpO2   23 1315 125/81 -- -- 95 -- 96 %   23 1224 -- -- -- -- -- 96 %   23 0852 -- 99.2  F (37.3  C) Temporal 107 18 99 %   23 0851 (!) 135/90 -- -- -- -- --        Physical Exam  Constitutional:       Appearance: She is well-developed.   HENT:       Right Ear: Tympanic membrane and external ear normal.      Left Ear: Tympanic membrane and external ear normal.      Mouth/Throat:      Mouth: Mucous membranes are moist.      Pharynx: Oropharynx is clear. Posterior oropharyngeal erythema present. No oropharyngeal exudate.   Eyes:      General: No scleral icterus.     Conjunctiva/sclera: Conjunctivae normal.      Pupils: Pupils are equal, round, and reactive to light.   Cardiovascular:      Rate and Rhythm: Normal rate and regular rhythm.      Heart sounds: Normal heart sounds. No murmur heard.     No friction rub. No gallop.   Pulmonary:      Effort: Pulmonary effort is normal. No respiratory distress.      Breath sounds: Normal breath sounds. No wheezing or rales.   Abdominal:      General: Bowel sounds are normal. There is no distension.      Palpations: Abdomen is soft. There is no mass.      Tenderness: There is no abdominal tenderness.   Musculoskeletal:         General: Normal range of motion.      Cervical back: Normal range of motion and neck supple.   Skin:     General: Skin is warm and dry.      Capillary Refill: Capillary refill takes less than 2 seconds.      Findings: No rash.   Neurological:      Mental Status: She is alert.           Emergency Department Course   EKG  ECG results from 06/08/23   EKG 12-lead, tracing only     Value    Systolic Blood Pressure     Diastolic Blood Pressure     Ventricular Rate 96    Atrial Rate 96    NV Interval 150    QRS Duration 82        QTc 457    P Axis 35    R AXIS 30    T Axis 14    Interpretation ECG      Sinus rhythm  Nonspecific ST abnormality  Abnormal ECG  No previous ECGs available       Laboratory:  Labs Ordered and Resulted from Time of ED Arrival to Time of ED Departure   BASIC METABOLIC PANEL - Abnormal       Result Value    Sodium 135 (*)     Potassium 4.0      Chloride 99      Carbon Dioxide (CO2) 21 (*)     Anion Gap 15      Urea Nitrogen 4.4 (*)     Creatinine 0.62      Calcium 8.6       Glucose 138 (*)     GFR Estimate >90     CBC WITH PLATELETS AND DIFFERENTIAL - Abnormal    WBC Count 12.6 (*)     RBC Count 4.42      Hemoglobin 12.1      Hematocrit 37.1      MCV 84      MCH 27.4      MCHC 32.6      RDW 13.2      Platelet Count 353      % Neutrophils 76      % Lymphocytes 16      % Monocytes 7      % Eosinophils 0      % Basophils 0      % Immature Granulocytes 1      NRBCs per 100 WBC 0      Absolute Neutrophils 9.6 (*)     Absolute Lymphocytes 2.0      Absolute Monocytes 0.9      Absolute Eosinophils 0.0      Absolute Basophils 0.0      Absolute Immature Granulocytes 0.1      Absolute NRBCs 0.0     ROUTINE UA WITH MICROSCOPIC REFLEX TO CULTURE - Abnormal    Color Urine Light Yellow      Appearance Urine Clear      Glucose Urine Negative      Bilirubin Urine Negative      Ketones Urine Negative      Specific Gravity Urine 1.009      Blood Urine Negative      pH Urine 5.5      Protein Albumin Urine Negative      Urobilinogen Urine Normal      Nitrite Urine Negative      Leukocyte Esterase Urine Negative      Mucus Urine Present (*)     RBC Urine 1      WBC Urine <1     HEPATIC FUNCTION PANEL - Normal    Protein Total 7.3      Albumin 3.9      Bilirubin Total 0.3      Alkaline Phosphatase 76      AST 19      ALT 17      Bilirubin Direct <0.20     INFLUENZA A/B, RSV, & SARS-COV2 PCR - Normal    Influenza A PCR Negative      Influenza B PCR Negative      RSV PCR Negative      SARS CoV2 PCR Negative     GROUP A STREPTOCOCCUS PCR THROAT SWAB - Normal    Group A strep by PCR Not Detected        Emergency Department Course & Assessments:    Interventions:  Medications   ondansetron (ZOFRAN) injection 4 mg (4 mg Intravenous $Given 6/8/23 1217)   0.9% sodium chloride BOLUS (1,000 mLs Intravenous $New Bag 6/8/23 1217)   alum & mag hydroxide-simethicone (MAALOX) suspension 15 mL (15 mLs Oral $Given 6/8/23 1218)      Independent Interpretation (X-rays, CTs, rhythm  strip):  None    Assessments/Consultations/Discussion of Management or Tests:  ED Course as of 06/08/23 1422   Thu Jun 08, 2023   1200 Dr. Kowalski's evaluation     Social Determinants of Health affecting care:   None    Disposition:  The patient was discharged to home.     Impression & Plan    CMS Diagnoses: None    Medical Decision Making:  Patient presents today for evaluation of above symptoms.  She did have a work-up at emergency room which resulted in no acute concerning finding.  She most likely has a viral infection.  We did repeat her strep and COVID swab.  They are all negative.  She has no abdominal pain or chest pain on exam.  Her throat was slightly erythematous likely part of her viral illness.  Given that her labs are reassuring, we did not pursue CT scan also.  She does not have any diarrhea to suggest viral gastroenteritis.  She is comfortable with close observation at home.  I did recommend over-the-counter medication she can use.  She did receive IV fluids here.  I reassured her.  Return precautions reviewed with her.    Diagnosis:    ICD-10-CM    1. Viral infection  B34.9          Scribe Trainer Disclosure:  I, PAOLA JOYCE, am serving as a  at 12:37 PM on 6/8/2023 to document services personally performed by Sandra Kowalski MD based on my observations and the provider's statements to me.    Scribe Disclosure:  I, Lillian Rosales, am serving as a scribe at 12:20 PM on 6/8/2023 to document services personally performed by Sandra Kowalski MD based on my observations and the provider's statements to me.     6/8/2023   Sandra Kowalski MD Cheng, Wenlan, MD  06/08/23 6208

## 2023-06-08 NOTE — ED TRIAGE NOTES
Pt reports a new diagnosis of neuropathy and recently given gabapentin. PT reports that Sunday night she started having generalized body aches, acid reflux feeling and insomnia. Stopped gabapentin Monday night. Pt states that her max temp at home was 102 subjectively. VSS and ABC's intact

## 2023-08-01 ENCOUNTER — TRANSFERRED RECORDS (OUTPATIENT)
Dept: HEALTH INFORMATION MANAGEMENT | Facility: CLINIC | Age: 48
End: 2023-08-01

## 2023-08-07 ENCOUNTER — HOSPITAL ENCOUNTER (OUTPATIENT)
Dept: MAMMOGRAPHY | Facility: CLINIC | Age: 48
Discharge: HOME | End: 2023-08-07
Attending: OBSTETRICS & GYNECOLOGY | Admitting: OBSTETRICS & GYNECOLOGY
Payer: COMMERCIAL

## 2023-08-07 DIAGNOSIS — Z12.31 VISIT FOR SCREENING MAMMOGRAM: ICD-10-CM

## 2023-08-07 PROCEDURE — 77067 SCR MAMMO BI INCL CAD: CPT

## 2023-08-12 ENCOUNTER — HEALTH MAINTENANCE LETTER (OUTPATIENT)
Age: 48
End: 2023-08-12

## 2023-08-25 ENCOUNTER — TRANSCRIBE ORDERS (OUTPATIENT)
Dept: OTHER | Age: 48
End: 2023-08-25

## 2023-08-25 DIAGNOSIS — G56.01 CARPAL TUNNEL SYNDROME OF RIGHT WRIST: Primary | ICD-10-CM

## 2023-09-11 ENCOUNTER — TELEPHONE (OUTPATIENT)
Dept: PALLIATIVE MEDICINE | Facility: CLINIC | Age: 48
End: 2023-09-11
Payer: COMMERCIAL

## 2023-09-11 NOTE — TELEPHONE ENCOUNTER
Daniel call back. Scheduled for injection 09/25/23    Tarsha HUGO, RN Care Coordinator  [facility]  Pain [facility]

## 2023-09-11 NOTE — TELEPHONE ENCOUNTER
Called pt and LM to call back.     Tarsha HUGO, RN Care Coordinator  [facility]  Pain [facility]

## 2023-09-11 NOTE — TELEPHONE ENCOUNTER
Patient wanted to know if there was anyway she could get this procedure- Diagnostic/therapeutic right median nerve block at the wrist for carpal tunnel symptoms. Abnormal EMG / NCS. Sooner than 11/8/23. Pt was advice by her neurologist to get this procedure done before the end of this Month. Please review and call pt back for advice.     Nette Fajardo      [facility]  Pain Management

## 2023-10-26 RX ORDER — ACETAMINOPHEN 325 MG/1
975 TABLET ORAL ONCE
Status: CANCELLED | OUTPATIENT
Start: 2023-10-26 | End: 2023-10-26

## 2023-10-26 RX ORDER — CEFAZOLIN SODIUM/WATER 2 G/20 ML
2 SYRINGE (ML) INTRAVENOUS SEE ADMIN INSTRUCTIONS
Status: CANCELLED | OUTPATIENT
Start: 2023-10-26

## 2023-10-26 RX ORDER — CEFAZOLIN SODIUM/WATER 2 G/20 ML
2 SYRINGE (ML) INTRAVENOUS
Status: CANCELLED | OUTPATIENT
Start: 2023-10-26

## 2023-10-26 NOTE — H&P (VIEW-ONLY)
[facility]      Preoperative Consultation   Chela Gleason   : 1975   Gender: female    Date of Encounter: 10/26/2023    Nursing Notes:   Kya Rose  10/26/2023  8:58 AM  Signed  Chief Complaint   Patient presents with     Preoperative Exam     Chela Gleason is a 48 y.o. female (1975) who presents for preoperative examination for total laparoscopic hysterectomy bilateral salpingectomy and bladder sling.    Date of Surgery: 11/10/2023  Surgical Specialty:  [facility]AN[facility], GYN/Urology, [facility]/Surgical Facility: [facility]  Fax number: OBGYN - [phone], GYN/Urology - [phone]  Surgery type: outpatient  Primary Physician: Huma Fox    Additional visit information (chief complaint/health maintenance) shared by patient:  None    Health Maintenance Due   Topic Date Due     HIV for age 15-65  Never done     Hepatitis C screening for age 18-79  Never done     Pap test for age 21-65  Never done     Lipids for age 45-75  2021     Mammogram for age 45-75  Never done     Influenza for age 9-49  2023     COVID-19 vaccine series (2023- season) 2023     Health maintenance reviewed with patient:  Yes  PCP:  Huma Fox DO    Patient presents for an in-person office visit:  alone  Communication Method:  Patient is active on [email or web address] and has been instructed that results/communications will be made via [email or web address]  If a phone call is needed, the preferred number is:  Mobile   Home Phone [phone]   Work Phone [phone]   Mobile [phone]     May we leave a detailed message at this number?:  Yes    Kya Rose CMA 10/26/2023  8:51 AM       History of Present Illness   Patient is planning for total laparoscopic hysterectomy and bladder sling.    She is otherwise healthy. Chronic joint aches but otherwise feels well.    Has not noted any changes in lymph nodes in her neck. Repeat ultrasound was  stable.     Review of Systems   A comprehensive review of systems was negative except for items noted in HPI.    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     No current facility-administered medications for this visit.     Allergies   Allergen Reactions     Amoxicillin Rash     Cefdinir Diarrhea, Nausea Only and Stomach Upset     Past Surgical History:   . Laterality Date     (IA) LA LAPAROSCOPIC RF ABLATION UTERINE FIBROIDS W US GUIDE  2019      SECTION  10/12/2020     ELBOW SURGERY Right     tennis elbow     KNEE ARTHROSCOPY Right     x2     lasik       MENISCECTOMY Left      perineal tendon repair Right      UTERINE FIBROID EMBOLIZATION  2017     WISDOM TEETH EXTRACTION       Social History     Tobacco Use     Smoking status: Never     Smokeless tobacco: Never   Vaping Use     Vaping Use: Never used   Substance Use Topics     Alcohol use: No     Comment: once montly     Drug use: Never     Family History   Problem Relation Age of Onset     Other Mother         Gallbladder Removed     Other cancer Mother         joint problem     Stroke Maternal Grandmother      Heart Disease Paternal Grandfather      Heart Disease Father      Valvular heart disease Father      Other Father         TIA     Heart attack Maternal Grandfather      Stroke Paternal Grandmother        PAST DIFFICULTY WITH ANESTHESIA: None  PERSONAL OF FAMILY  HISTORY OF BLEEDING/CLOTTING DISORDERS: None     Physical Exam   /72 (Cuff Site: Right Arm, Position:  "Sitting, Cuff Size: Adult Large)   Pulse 78   Temp 98.7  F (37.1  C) (Tympanic)   Ht 1.651 m (5' 5\")   Wt 93.9 kg (207 lb)   LMP 10/09/2023 (Approximate)   SpO2 98%   Breastfeeding No   BMI 34.45 kg/m   Body mass index is 34.45 kg/m .  General Appearance: Pleasant, alert, appropriate appearance for age. No acute distress  Head Exam: Normal. Normocephalic, atraumatic.  Eye Exam: Normal external eye, conjunctiva, lids, cornea. ALYSA.  Ear Exam: Normal TM's bilaterally. Normal auditory canals and external ears. Non-tender.  Nose Exam: Normal.  OroPharynx Exam: Dental hygiene adequate. Normal buccal mucosa. Normal pharynx.  Neck Exam: bilateral anterior cervical lymphadenopathy, unchanged, nontender, mobile  Thyroid Exam: No nodules or enlargement.  Chest/Respiratory Exam: Normal chest wall and respirations. Clear to auscultation.  Cardiovascular Exam: Regular rate and rhythm. S1, S2, no murmur, click, gallop, or rubs.  Gastrointestinal Exam: Soft, nontender, no abnormal masses or organomegaly.  Lymphatic Exam: see above, otherwise normal  Musculoskeletal Exam: Back is straight and non-tender, full ROM of upper and lower extremities.  Skin: no rash or abnormalities  Neurologic Exam: grossly intact  Psychiatric Exam: Alert and oriented, appropriate affect.     Assessment / Plan       The Pre-Op Tool    Recommendations      Intermediate Risk Procedure    Risk of CV Complication (RCRI)  0.5%    Current Cardiac Status  Good exertional capacity ( > 4 mets )    Cardiac History  No history of coronary artery disease    COVID-19  COVID-19 > 7 weeks ago, now asymptomatic: proceed with surgery as planned.           Labs  HGB within last 30 days  EKG  Not indicated  CXR  Not indicated    Stress Testing  Not indicated    * Testing recommendations are intended to assist, but not direct, clinical decisions.           Type & Screen should be obtained by Anesthesia only if the risk of transfusion is > 5% for the procedure   "       Take your other medications as usual prior to the procedure  Hold vitamins and/or supplements for 1 week prior to the procedure  Hold fish oil for 2 weeks prior to the procedure  Okay to take Acetaminophen (Tylenol) up until the procedure  Hold / avoid NSAIDs (e.g. ibuprofen, naproxen) prior to procedure: 2 days for ibuprofen (Advil) and 4 days for naproxen (Aleve).    * Medication recommendations are not intended to be exhaustive; they are limited to common medications that are potentially dangerous if incorrectly managed          Labs  * Data supports elimination of  routine  laboratory testing in favor of focused,  indicated  testing based on medical co-morbidities. A 2009 study randomized 1061 patients undergoing ambulatory, non-cataract surgery to routine or to indicated testing. Perioperative adverse events were similar (Anesthesia & Analgesia 2009;108:467-75; Anesthesiol. Clin. 2016 Mar;34(1):43-58).  EKG  * Age alone is not an absolute indication for a preoperative EKG, and in the absence of clinical risk factors, there is no consensus on the utility of routine preoperative EKG. Our experts recommend against obtaining an EKG if age < 65 for intermediate risk procedures (Anesthesology. 2012;116(3) 1-17; JACC. 2014;64(21);e1-76).  Stress Testing  * The current ACC/AHA guideline states that 'non-invasive stress testing is not useful for patients [with no clinical risk factors] undergoing noncardiac surgery' (JACC. 2014;64(21);e1-76.).     Session ID: [id number]  Endnotes and bibliography available upon request: [email or web address]    Labs:   Urine pregnancy test: negative  Hemoglobin: 13.0, within normal limits    ECG: not indicated    Chela was seen today for preoperative exam.    Diagnoses and all orders for this visit:    Pre-op exam  Hold NSAIDS 7 days  prior to surgery.  Okay to take tylenol if needed.  Hold fish oil for 2 weeks prior to surgery.  Hold vitamins/supplements for 7 days  prior to surgery.  -     PREGNANCY URINE  -     HEMOGLOBIN    Need for prophylactic vaccination and inoculation against influenza  -     FluLaval Single Dose Syringe - FLU VACCINE => 6 MOS PRESERV FREE QUADRIVALENT IIV4 IM [[id number]]    Enlarged lymph node in neck  No changes. Will plan for one more repeat ultrasound.  -     US NECK OR HEAD SOFT TISSUE; Future        Patient is cleared for planned procedure.     Total time preparing to see this patient, face-to-face time, and coordinating care time on the same calendar date: 18 minutes.    Electronically Signed by:   Huma Fox DO  10/26/2023

## 2023-11-01 RX ORDER — ACETAMINOPHEN 325 MG/1
975 TABLET ORAL ONCE
Status: CANCELLED | OUTPATIENT
Start: 2023-11-01 | End: 2023-11-01

## 2023-11-01 RX ORDER — CEFAZOLIN SODIUM/WATER 2 G/20 ML
2 SYRINGE (ML) INTRAVENOUS
Status: CANCELLED | OUTPATIENT
Start: 2023-11-01

## 2023-11-01 RX ORDER — CEFAZOLIN SODIUM/WATER 2 G/20 ML
2 SYRINGE (ML) INTRAVENOUS SEE ADMIN INSTRUCTIONS
Status: CANCELLED | OUTPATIENT
Start: 2023-11-01

## 2023-11-09 ENCOUNTER — ANESTHESIA EVENT (OUTPATIENT)
Dept: SURGERY | Facility: CLINIC | Age: 48
End: 2023-11-09
Payer: COMMERCIAL

## 2023-11-10 ENCOUNTER — HOSPITAL ENCOUNTER (OUTPATIENT)
Facility: CLINIC | Age: 48
Discharge: HOME | End: 2023-11-10
Attending: OBSTETRICS & GYNECOLOGY | Admitting: OBSTETRICS & GYNECOLOGY
Payer: COMMERCIAL

## 2023-11-10 ENCOUNTER — ANESTHESIA (OUTPATIENT)
Dept: SURGERY | Facility: CLINIC | Age: 48
End: 2023-11-10
Payer: COMMERCIAL

## 2023-11-10 VITALS
BODY MASS INDEX: 34.24 KG/M2 | TEMPERATURE: 97.8 F | DIASTOLIC BLOOD PRESSURE: 82 MMHG | WEIGHT: 205.5 LBS | HEIGHT: 65 IN | OXYGEN SATURATION: 98 % | SYSTOLIC BLOOD PRESSURE: 143 MMHG | HEART RATE: 83 BPM | RESPIRATION RATE: 18 BRPM

## 2023-11-10 DIAGNOSIS — Z90.710 S/P TOTAL HYSTERECTOMY: Primary | ICD-10-CM

## 2023-11-10 LAB
ABO/RH(D): NORMAL
ANTIBODY SCREEN: NEGATIVE
HCG SERPL QL: NEGATIVE
HGB BLD-MCNC: 13.2 G/DL (ref 11.7–15.7)
SPECIMEN EXPIRATION DATE: NORMAL

## 2023-11-10 PROCEDURE — 250N000013 HC RX MED GY IP 250 OP 250 PS 637: Performed by: ANESTHESIOLOGY

## 2023-11-10 PROCEDURE — 250N000011 HC RX IP 250 OP 636: Performed by: OBSTETRICS & GYNECOLOGY

## 2023-11-10 PROCEDURE — 272N000001 HC OR GENERAL SUPPLY STERILE: Performed by: OBSTETRICS & GYNECOLOGY

## 2023-11-10 PROCEDURE — 710N000012 HC RECOVERY PHASE 2, PER MINUTE: Performed by: OBSTETRICS & GYNECOLOGY

## 2023-11-10 PROCEDURE — 258N000003 HC RX IP 258 OP 636

## 2023-11-10 PROCEDURE — 258N000001 HC RX 258: Performed by: OBSTETRICS & GYNECOLOGY

## 2023-11-10 PROCEDURE — 250N000009 HC RX 250: Performed by: OBSTETRICS & GYNECOLOGY

## 2023-11-10 PROCEDURE — 84703 CHORIONIC GONADOTROPIN ASSAY: CPT | Performed by: OBSTETRICS & GYNECOLOGY

## 2023-11-10 PROCEDURE — 258N000003 HC RX IP 258 OP 636: Performed by: ANESTHESIOLOGY

## 2023-11-10 PROCEDURE — 88307 TISSUE EXAM BY PATHOLOGIST: CPT | Mod: TC | Performed by: OBSTETRICS & GYNECOLOGY

## 2023-11-10 PROCEDURE — 88305 TISSUE EXAM BY PATHOLOGIST: CPT | Mod: TC | Performed by: OBSTETRICS & GYNECOLOGY

## 2023-11-10 PROCEDURE — 250N000011 HC RX IP 250 OP 636: Mod: JZ | Performed by: ANESTHESIOLOGY

## 2023-11-10 PROCEDURE — 999N000141 HC STATISTIC PRE-PROCEDURE NURSING ASSESSMENT: Performed by: OBSTETRICS & GYNECOLOGY

## 2023-11-10 PROCEDURE — 250N000011 HC RX IP 250 OP 636

## 2023-11-10 PROCEDURE — C1771 REP DEV, URINARY, W/SLING: HCPCS | Performed by: OBSTETRICS & GYNECOLOGY

## 2023-11-10 PROCEDURE — 360N000077 HC SURGERY LEVEL 4, PER MIN: Performed by: OBSTETRICS & GYNECOLOGY

## 2023-11-10 PROCEDURE — 86901 BLOOD TYPING SEROLOGIC RH(D): CPT | Performed by: OBSTETRICS & GYNECOLOGY

## 2023-11-10 PROCEDURE — 710N000010 HC RECOVERY PHASE 1, LEVEL 2, PER MIN: Performed by: OBSTETRICS & GYNECOLOGY

## 2023-11-10 PROCEDURE — 88305 TISSUE EXAM BY PATHOLOGIST: CPT | Mod: 26 | Performed by: PATHOLOGY

## 2023-11-10 PROCEDURE — 370N000017 HC ANESTHESIA TECHNICAL FEE, PER MIN: Performed by: OBSTETRICS & GYNECOLOGY

## 2023-11-10 PROCEDURE — 250N000012 HC RX MED GY IP 250 OP 636 PS 637: Performed by: ANESTHESIOLOGY

## 2023-11-10 PROCEDURE — 88307 TISSUE EXAM BY PATHOLOGIST: CPT | Mod: 26 | Performed by: PATHOLOGY

## 2023-11-10 PROCEDURE — 85018 HEMOGLOBIN: CPT | Performed by: OBSTETRICS & GYNECOLOGY

## 2023-11-10 PROCEDURE — 88112 CYTOPATH CELL ENHANCE TECH: CPT | Mod: 26 | Performed by: PATHOLOGY

## 2023-11-10 PROCEDURE — 250N000009 HC RX 250

## 2023-11-10 PROCEDURE — 250N000025 HC SEVOFLURANE, PER MIN: Performed by: OBSTETRICS & GYNECOLOGY

## 2023-11-10 PROCEDURE — 86850 RBC ANTIBODY SCREEN: CPT | Performed by: OBSTETRICS & GYNECOLOGY

## 2023-11-10 PROCEDURE — 36415 COLL VENOUS BLD VENIPUNCTURE: CPT | Performed by: OBSTETRICS & GYNECOLOGY

## 2023-11-10 DEVICE — TRANSVAGINAL MID-URETHRAL SLING
Type: IMPLANTABLE DEVICE | Site: URETHRA | Status: FUNCTIONAL
Brand: ADVANTAGE FIT™  SYSTEM

## 2023-11-10 RX ORDER — HYDROMORPHONE HCL IN WATER/PF 6 MG/30 ML
0.4 PATIENT CONTROLLED ANALGESIA SYRINGE INTRAVENOUS EVERY 5 MIN PRN
Status: DISCONTINUED | OUTPATIENT
Start: 2023-11-10 | End: 2023-11-10 | Stop reason: HOSPADM

## 2023-11-10 RX ORDER — DEXMEDETOMIDINE HYDROCHLORIDE 4 UG/ML
INJECTION, SOLUTION INTRAVENOUS PRN
Status: DISCONTINUED | OUTPATIENT
Start: 2023-11-10 | End: 2023-11-10

## 2023-11-10 RX ORDER — HYDROMORPHONE HCL IN WATER/PF 6 MG/30 ML
0.2 PATIENT CONTROLLED ANALGESIA SYRINGE INTRAVENOUS EVERY 5 MIN PRN
Status: DISCONTINUED | OUTPATIENT
Start: 2023-11-10 | End: 2023-11-10 | Stop reason: HOSPADM

## 2023-11-10 RX ORDER — OXYCODONE HYDROCHLORIDE 5 MG/1
5-10 TABLET ORAL EVERY 4 HOURS PRN
Qty: 20 TABLET | Refills: 0 | Status: SHIPPED | OUTPATIENT
Start: 2023-11-10

## 2023-11-10 RX ORDER — DEXAMETHASONE SODIUM PHOSPHATE 4 MG/ML
INJECTION, SOLUTION INTRA-ARTICULAR; INTRALESIONAL; INTRAMUSCULAR; INTRAVENOUS; SOFT TISSUE PRN
Status: DISCONTINUED | OUTPATIENT
Start: 2023-11-10 | End: 2023-11-10

## 2023-11-10 RX ORDER — APREPITANT 40 MG/1
40 CAPSULE ORAL ONCE
Status: COMPLETED | OUTPATIENT
Start: 2023-11-10 | End: 2023-11-10

## 2023-11-10 RX ORDER — FENTANYL CITRATE 0.05 MG/ML
50 INJECTION, SOLUTION INTRAMUSCULAR; INTRAVENOUS EVERY 5 MIN PRN
Status: DISCONTINUED | OUTPATIENT
Start: 2023-11-10 | End: 2023-11-10 | Stop reason: HOSPADM

## 2023-11-10 RX ORDER — MAGNESIUM HYDROXIDE 1200 MG/15ML
LIQUID ORAL PRN
Status: DISCONTINUED | OUTPATIENT
Start: 2023-11-10 | End: 2023-11-10 | Stop reason: HOSPADM

## 2023-11-10 RX ORDER — KETOROLAC TROMETHAMINE 30 MG/ML
INJECTION, SOLUTION INTRAMUSCULAR; INTRAVENOUS PRN
Status: DISCONTINUED | OUTPATIENT
Start: 2023-11-10 | End: 2023-11-10

## 2023-11-10 RX ORDER — MINERAL OIL
OIL (ML) MISCELLANEOUS
Status: DISCONTINUED
Start: 2023-11-10 | End: 2023-11-10 | Stop reason: HOSPADM

## 2023-11-10 RX ORDER — FENTANYL CITRATE 0.05 MG/ML
25 INJECTION, SOLUTION INTRAMUSCULAR; INTRAVENOUS EVERY 5 MIN PRN
Status: DISCONTINUED | OUTPATIENT
Start: 2023-11-10 | End: 2023-11-10 | Stop reason: HOSPADM

## 2023-11-10 RX ORDER — LIDOCAINE 40 MG/G
CREAM TOPICAL
Status: DISCONTINUED | OUTPATIENT
Start: 2023-11-10 | End: 2023-11-10 | Stop reason: HOSPADM

## 2023-11-10 RX ORDER — BUPIVACAINE HYDROCHLORIDE 5 MG/ML
INJECTION, SOLUTION PERINEURAL PRN
Status: DISCONTINUED | OUTPATIENT
Start: 2023-11-10 | End: 2023-11-10 | Stop reason: HOSPADM

## 2023-11-10 RX ORDER — SODIUM CHLORIDE, SODIUM LACTATE, POTASSIUM CHLORIDE, CALCIUM CHLORIDE 600; 310; 30; 20 MG/100ML; MG/100ML; MG/100ML; MG/100ML
INJECTION, SOLUTION INTRAVENOUS CONTINUOUS PRN
Status: DISCONTINUED | OUTPATIENT
Start: 2023-11-10 | End: 2023-11-10

## 2023-11-10 RX ORDER — BUPIVACAINE HYDROCHLORIDE 5 MG/ML
INJECTION, SOLUTION EPIDURAL; INTRACAUDAL
Status: DISCONTINUED
Start: 2023-11-10 | End: 2023-11-10 | Stop reason: HOSPADM

## 2023-11-10 RX ORDER — ONDANSETRON 4 MG/1
4 TABLET, ORALLY DISINTEGRATING ORAL EVERY 30 MIN PRN
Status: DISCONTINUED | OUTPATIENT
Start: 2023-11-10 | End: 2023-11-10 | Stop reason: HOSPADM

## 2023-11-10 RX ORDER — IBUPROFEN 400 MG/1
800 TABLET, FILM COATED ORAL ONCE
Status: DISCONTINUED | OUTPATIENT
Start: 2023-11-10 | End: 2023-11-10 | Stop reason: HOSPADM

## 2023-11-10 RX ORDER — OXYCODONE HYDROCHLORIDE 5 MG/1
5 TABLET ORAL ONCE
Status: COMPLETED | OUTPATIENT
Start: 2023-11-10 | End: 2023-11-10

## 2023-11-10 RX ORDER — MINERAL OIL
OIL (ML) MISCELLANEOUS PRN
Status: DISCONTINUED | OUTPATIENT
Start: 2023-11-10 | End: 2023-11-10 | Stop reason: HOSPADM

## 2023-11-10 RX ORDER — PROPOFOL 10 MG/ML
INJECTION, EMULSION INTRAVENOUS CONTINUOUS PRN
Status: DISCONTINUED | OUTPATIENT
Start: 2023-11-10 | End: 2023-11-10

## 2023-11-10 RX ORDER — ACETAMINOPHEN 325 MG/1
975 TABLET ORAL ONCE
Status: DISCONTINUED | OUTPATIENT
Start: 2023-11-10 | End: 2023-11-10 | Stop reason: HOSPADM

## 2023-11-10 RX ORDER — HYDROMORPHONE HYDROCHLORIDE 1 MG/ML
INJECTION, SOLUTION INTRAMUSCULAR; INTRAVENOUS; SUBCUTANEOUS PRN
Status: DISCONTINUED | OUTPATIENT
Start: 2023-11-10 | End: 2023-11-10

## 2023-11-10 RX ORDER — CEFAZOLIN SODIUM/WATER 2 G/20 ML
2 SYRINGE (ML) INTRAVENOUS SEE ADMIN INSTRUCTIONS
Status: DISCONTINUED | OUTPATIENT
Start: 2023-11-10 | End: 2023-11-10 | Stop reason: HOSPADM

## 2023-11-10 RX ORDER — ACETAMINOPHEN 325 MG/1
975 TABLET ORAL ONCE
Status: COMPLETED | OUTPATIENT
Start: 2023-11-10 | End: 2023-11-10

## 2023-11-10 RX ORDER — HYDROXYZINE HYDROCHLORIDE 25 MG/1
25 TABLET, FILM COATED ORAL ONCE
Status: COMPLETED | OUTPATIENT
Start: 2023-11-10 | End: 2023-11-10

## 2023-11-10 RX ORDER — ONDANSETRON 2 MG/ML
INJECTION INTRAMUSCULAR; INTRAVENOUS PRN
Status: DISCONTINUED | OUTPATIENT
Start: 2023-11-10 | End: 2023-11-10

## 2023-11-10 RX ORDER — SODIUM CHLORIDE, SODIUM LACTATE, POTASSIUM CHLORIDE, CALCIUM CHLORIDE 600; 310; 30; 20 MG/100ML; MG/100ML; MG/100ML; MG/100ML
INJECTION, SOLUTION INTRAVENOUS CONTINUOUS
Status: DISCONTINUED | OUTPATIENT
Start: 2023-11-10 | End: 2023-11-10 | Stop reason: HOSPADM

## 2023-11-10 RX ORDER — CEFAZOLIN SODIUM/WATER 2 G/20 ML
2 SYRINGE (ML) INTRAVENOUS
Status: COMPLETED | OUTPATIENT
Start: 2023-11-10 | End: 2023-11-10

## 2023-11-10 RX ORDER — PROPOFOL 10 MG/ML
INJECTION, EMULSION INTRAVENOUS PRN
Status: DISCONTINUED | OUTPATIENT
Start: 2023-11-10 | End: 2023-11-10

## 2023-11-10 RX ORDER — LABETALOL HYDROCHLORIDE 5 MG/ML
10 INJECTION, SOLUTION INTRAVENOUS
Status: DISCONTINUED | OUTPATIENT
Start: 2023-11-10 | End: 2023-11-10 | Stop reason: HOSPADM

## 2023-11-10 RX ORDER — FENTANYL CITRATE 50 UG/ML
INJECTION, SOLUTION INTRAMUSCULAR; INTRAVENOUS PRN
Status: DISCONTINUED | OUTPATIENT
Start: 2023-11-10 | End: 2023-11-10

## 2023-11-10 RX ORDER — ONDANSETRON 2 MG/ML
4 INJECTION INTRAMUSCULAR; INTRAVENOUS EVERY 30 MIN PRN
Status: DISCONTINUED | OUTPATIENT
Start: 2023-11-10 | End: 2023-11-10 | Stop reason: HOSPADM

## 2023-11-10 RX ORDER — LIDOCAINE HYDROCHLORIDE 20 MG/ML
INJECTION, SOLUTION INFILTRATION; PERINEURAL PRN
Status: DISCONTINUED | OUTPATIENT
Start: 2023-11-10 | End: 2023-11-10

## 2023-11-10 RX ADMIN — FENTANYL CITRATE 50 MCG: 50 INJECTION, SOLUTION INTRAMUSCULAR; INTRAVENOUS at 13:12

## 2023-11-10 RX ADMIN — FENTANYL CITRATE 50 MCG: 50 INJECTION, SOLUTION INTRAMUSCULAR; INTRAVENOUS at 13:52

## 2023-11-10 RX ADMIN — MIDAZOLAM 2 MG: 1 INJECTION INTRAMUSCULAR; INTRAVENOUS at 10:15

## 2023-11-10 RX ADMIN — DEXAMETHASONE SODIUM PHOSPHATE 8 MG: 4 INJECTION, SOLUTION INTRA-ARTICULAR; INTRALESIONAL; INTRAMUSCULAR; INTRAVENOUS; SOFT TISSUE at 10:22

## 2023-11-10 RX ADMIN — SUGAMMADEX 200 MG: 100 INJECTION, SOLUTION INTRAVENOUS at 12:42

## 2023-11-10 RX ADMIN — HYDROMORPHONE HYDROCHLORIDE 0.5 MG: 1 INJECTION, SOLUTION INTRAMUSCULAR; INTRAVENOUS; SUBCUTANEOUS at 11:54

## 2023-11-10 RX ADMIN — ONDANSETRON 4 MG: 2 INJECTION INTRAMUSCULAR; INTRAVENOUS at 12:40

## 2023-11-10 RX ADMIN — ROCURONIUM BROMIDE 20 MG: 50 INJECTION, SOLUTION INTRAVENOUS at 11:54

## 2023-11-10 RX ADMIN — KETOROLAC TROMETHAMINE 30 MG: 30 INJECTION, SOLUTION INTRAMUSCULAR at 12:40

## 2023-11-10 RX ADMIN — ROCURONIUM BROMIDE 20 MG: 50 INJECTION, SOLUTION INTRAVENOUS at 11:27

## 2023-11-10 RX ADMIN — FENTANYL CITRATE 50 MCG: 50 INJECTION, SOLUTION INTRAMUSCULAR; INTRAVENOUS at 13:24

## 2023-11-10 RX ADMIN — OXYCODONE HYDROCHLORIDE 5 MG: 5 TABLET ORAL at 15:32

## 2023-11-10 RX ADMIN — ROCURONIUM BROMIDE 20 MG: 50 INJECTION, SOLUTION INTRAVENOUS at 10:36

## 2023-11-10 RX ADMIN — DEXMEDETOMIDINE HYDROCHLORIDE 20 MCG: 200 INJECTION INTRAVENOUS at 10:54

## 2023-11-10 RX ADMIN — PROPOFOL 30 MCG/KG/MIN: 10 INJECTION, EMULSION INTRAVENOUS at 10:22

## 2023-11-10 RX ADMIN — FENTANYL CITRATE 100 MCG: 50 INJECTION INTRAMUSCULAR; INTRAVENOUS at 10:22

## 2023-11-10 RX ADMIN — HYDROMORPHONE HYDROCHLORIDE 0.4 MG: 0.2 INJECTION, SOLUTION INTRAMUSCULAR; INTRAVENOUS; SUBCUTANEOUS at 15:18

## 2023-11-10 RX ADMIN — HYDROXYZINE HYDROCHLORIDE 25 MG: 25 TABLET, FILM COATED ORAL at 08:28

## 2023-11-10 RX ADMIN — LIDOCAINE HYDROCHLORIDE 100 MG: 20 INJECTION, SOLUTION INFILTRATION; PERINEURAL at 10:22

## 2023-11-10 RX ADMIN — APREPITANT 40 MG: 40 CAPSULE ORAL at 08:28

## 2023-11-10 RX ADMIN — ACETAMINOPHEN 975 MG: 325 TABLET, FILM COATED ORAL at 08:28

## 2023-11-10 RX ADMIN — SODIUM CHLORIDE, POTASSIUM CHLORIDE, SODIUM LACTATE AND CALCIUM CHLORIDE: 600; 310; 30; 20 INJECTION, SOLUTION INTRAVENOUS at 14:08

## 2023-11-10 RX ADMIN — Medication 2 G: at 10:15

## 2023-11-10 RX ADMIN — ROCURONIUM BROMIDE 10 MG: 50 INJECTION, SOLUTION INTRAVENOUS at 11:40

## 2023-11-10 RX ADMIN — PROPOFOL 200 MG: 10 INJECTION, EMULSION INTRAVENOUS at 10:22

## 2023-11-10 RX ADMIN — SODIUM CHLORIDE, POTASSIUM CHLORIDE, SODIUM LACTATE AND CALCIUM CHLORIDE: 600; 310; 30; 20 INJECTION, SOLUTION INTRAVENOUS at 12:18

## 2023-11-10 RX ADMIN — SODIUM CHLORIDE, POTASSIUM CHLORIDE, SODIUM LACTATE AND CALCIUM CHLORIDE: 600; 310; 30; 20 INJECTION, SOLUTION INTRAVENOUS at 10:15

## 2023-11-10 RX ADMIN — ROCURONIUM BROMIDE 50 MG: 50 INJECTION, SOLUTION INTRAVENOUS at 10:22

## 2023-11-10 ASSESSMENT — ENCOUNTER SYMPTOMS
SEIZURES: 0
DYSRHYTHMIAS: 0

## 2023-11-10 ASSESSMENT — ACTIVITIES OF DAILY LIVING (ADL)
ADLS_ACUITY_SCORE: 35

## 2023-11-10 ASSESSMENT — LIFESTYLE VARIABLES: TOBACCO_USE: 0

## 2023-11-10 NOTE — INTERVAL H&P NOTE
"I have reviewed the surgical (or preoperative) H&P that is linked to this encounter, and examined the patient. There are no significant changes    Clinical Conditions Present on Arrival:  Clinically Significant Risk Factors Present on Admission                  # Obesity: Estimated body mass index is 34.2 kg/m  as calculated from the following:    Height as of this encounter: 1.651 m (5' 5\").    Weight as of this encounter: 93.2 kg (205 lb 8 oz).       "

## 2023-11-10 NOTE — OP NOTE
OPERATIVE REPORT    PATIENT: Chela Gleason  MR#: [id number]  : 1975    DATE OF OPERATION: November 10, 2023    PREOPERATIVE DIAGNOSES:  Stress urinary incontinence.    POSTOPERATIVE DIAGNOSES:  Stress urinary incontinence.    PROCEDURES:  Retropubic Midurethral Sling. [employer] Advantage fit device  Cystourethroscopy.  Laparoscopic hysterectomy performed by Dr. Rafat Mak, dictated separately    SURGEON: Sarah Moise MD     ANESTHESIA:  General  ESTIMATED BLOOD LOSS:  25 ml  IV FLUIDS:  Per anesthesia ml of crystalloid.  COMPLICATIONS:  None.  DRAINS:  16-Azerbaijani Marquis catheter to gravity drainage.  FINDINGS:  The bladder was found to be free of lesion. Ureters were in their normal anatomic position. The urethra was normal appearing. Ureters were noted to be patent bilaterally.    INDICATIONS:  This patient was seen in consultation regarding urinary incontinence. Please refer to her clinic documentation for a complete description of her evaluation and treatment plan. She was desirous of a definitive surgical approach. Prior to the procedure, the risks, benefits, indications, and alternatives were discussed. Written and verbal consent were obtained.    PROCEDURE IN DETAIL:  Pt was brought to the operating suite. She was administered prophylactic IV antibiotics and had sequential compression devices present on her lower extremities.  She was administered anesthesia, then positioned in dorsal lithotomy position with her legs carefully stationed in [place] stirrups with attention to avoiding pressure points. She was now sterilely prepped and draped in usual fashion.  Her bladder was drained with an 18-Azerbaijani Marquis catheter.   Dr. Rafat Mak now performed a laparoscopic hysterectomy, dictated separately.  Once the abdominal incisions were closed, I assumed operative control.    Retropubic Midurethral Sling.  Two marks were created on the anterior abdominal wall 2.5 cm lateral to midline at the  level of the pubic bone. Local anesthetic was injected into the subcutaneous tissue below the marks. Attention was turned to the vagina, where an Allis clamp was applied 1 cm distal from the meatus, an additional Allis clamp 2.5 cm from the meatus. The periurethral tissue was injected with a solution of Marcaine with epinephrine.  A 1.5 cm incision was created between the 2 Allis clamps beneath the mid urethra in the sagittal plane. Two periurethral tunnels were then created out laterally towards the pubic bone. Once an adequate dissection had been performed, the [employer] Advantage fit device was selected. The trocar was brought through the patient's left periurethral tunnel back behind the pubic bone, through the space of Retzius, and out through the previously created rebecca on the anterior abdominal wall.  This was repeated in a similar fashion on the patient's right side.     Cystourethroscopy was now performed with the above-noted normal findings. The cystoscope was removed. The trocars were brought through the skin incisions, thus positioning the sling mesh in a U configuration under the urethra. The sling material was appropriately positioned beneath the mid urethra with no overt tension. The resultant incision was closed with a running locking suture of 2-0 Vicryl.    The vagina was packed with a saline-soaked vaginal packing. Excess sling material on the anterior abdominal wall was trimmed. The resultant 2 mm incisions were closed with Dermabond. Sponge, lap, and needle counts were found to be correct. There were no complications from surgery.    Patient was awoken from anesthesia and brought to the recovery room in excellent condition.    Sarah Moise MD

## 2023-11-10 NOTE — BRIEF OP NOTE
[facility]    Brief Operative Note    Pre-operative diagnosis: 1. Uterine leiomyoma, unspecified location [D25.9], 2. Endometriosis, 3. Pelvic pain, 4. Female stress incontinence [N39.3]    Vaginal pain [R10.2]  Post-operative diagnosis Same as pre-operative diagnosis    Procedure: Single incision total laparoscopic hysterectomy bilateral salpingectomy, Bilateral - Abdomen  retropubic midurethral sling and cystoscopy, N/A - Vagina  Pelvic washings  Surgeon: Surgeon(s) and Role:  Panel 1:     * Robles Martines MD - Primary  Panel 2:     * Sarah Moise MD - Primary  Assistant with hysterectomy: Dr. Heather Couch  Anesthesia: General   Estimated Blood Loss: Less than 50 ml    Drains: None  Specimens:   ID Type Source Tests Collected by Time Destination   1 : PELVIC WASHINGS Washings Pelvis NON-GYNECOLOGIC CYTOLOGY Robles Martines MD 11/10/2023 11:19 AM    2 : Uterus, Cervix, Bilateral Fallopian Tubes Tissue Uterus, Cervix, Bilateral Fallopian Tubes SURGICAL PATHOLOGY EXAM Robles Martines MD 11/10/2023 12:13 PM      Findings:   Normal sized uterus with small subserosal fibroids.  Small cystic implants of the uterine serosa - suspect endometriosis.   Defect vs cyst along the R anterior fundal aspect of the myometrium. Normal appearing BL tubes and ovaries.   Complications: None.  Implants:   Implant Name Type Inv. Item Serial No.  Lot No. LRB No. Used Action   MESH SLING ADVANTAGE FIT SYSTEM [id number] - [id number] Mesh MESH SLING ADVANTAGE FIT SYSTEM [id number]  [id number] [id number] N/A 1 Implanted

## 2023-11-10 NOTE — ANESTHESIA PROCEDURE NOTES
Airway       Patient location during procedure: OR       Procedure Start/Stop Times: 11/10/2023 10:25 AM  Staff -        Anesthesiologist:  Valdemar Land DO       CRNA: Bryson Ricardo APRN CRNA       Performed By: CRNA  Consent for Airway        Urgency: elective  Indications and Patient Condition       Indications for airway management: heidi-procedural       Induction type:intravenous       Mask difficulty assessment: 1 - vent by mask    Final Airway Details       Final airway type: endotracheal airway       Successful airway: ETT - single and Oral  Endotracheal Airway Details        ETT size (mm): 7.0       Cuffed: yes       Successful intubation technique: video laryngoscopy       VL Blade Size: Clemens 3       Grade View of Cords: 1       Adjucts: stylet       Position: Right       Measured from: lips       Secured at (cm): 20       Bite block used: None    Post intubation assessment        Placement verified by: capnometry, equal breath sounds and chest rise        Number of attempts at approach: 1       Number of other approaches attempted: 0       Secured with: silk tape       Ease of procedure: easy       Dentition: Intact and Unchanged    Medication(s) Administered   Medication Administration Time: 11/10/2023 10:25 AM

## 2023-11-10 NOTE — ANESTHESIA POSTPROCEDURE EVALUATION
Patient: Chela Gleason    Procedure: Procedure(s):  total laparoscopic hysterectomy bilateral salpingectomy  retropubic midurethral sling and cystoscopy       Anesthesia Type:  General    Note:  Disposition: Inpatient   Postop Pain Control: Uneventful            Sign Out: Well controlled pain   PONV: No   Neuro/Psych: Uneventful            Sign Out: Acceptable/Baseline neuro status   Airway/Respiratory: Uneventful            Sign Out: Acceptable/Baseline resp. status   CV/Hemodynamics: Uneventful            Sign Out: Acceptable CV status; No obvious hypovolemia; No obvious fluid overload   Other NRE: NONE   DID A NON-ROUTINE EVENT OCCUR? No           Last vitals:  Vitals Value Taken Time   /67 11/10/23 1300   Temp 36.6  C (97.9  F) 11/10/23 1255   Pulse 74 11/10/23 1313   Resp 13 11/10/23 1313   SpO2 92 % 11/10/23 1313   Vitals shown include unfiled device data.    Electronically Signed By: Valdemar Land DO  November 10, 2023  1:14 PM

## 2023-11-10 NOTE — DISCHARGE INSTRUCTIONS
Same Day Surgery Discharge Instructions for  Sedation and General Anesthesia     It's not unusual to feel dizzy, light-headed or faint for up to 24 hours after surgery or while taking pain medication.  If you have these symptoms: sit for a few minutes before standing and have someone assist you when you get up to walk or use the bathroom.    You should rest and relax for the next 24 hours. We recommend you make arrangements to have an adult stay with you for at least 24 hours after your discharge.  Avoid hazardous and strenuous activity.    DO NOT DRIVE any vehicle or operate mechanical equipment for 24 hours following the end of your surgery.  Even though you may feel normal, your reactions may be affected by the medication you have received.    Do not drink alcoholic beverages for 24 hours following surgery.     Slowly progress to your regular diet as you feel able. It's not unusual to feel nauseated and/or vomit after receiving anesthesia.  If you develop these symptoms, drink clear liquids (apple juice, ginger ale, broth, 7-up, etc. ) until you feel better.  If your nausea and vomiting persists for 24 hours, please notify your surgeon.      All narcotic pain medications, along with inactivity and anesthesia, can cause constipation. Drinking plenty of liquids and increasing fiber intake will help.    For any questions of a medical nature, call your surgeon.    Do not make important decisions for 24 hours.    If you had general anesthesia, you may have a sore throat for a couple of days related to the breathing tube used during surgery.  You may use Cepacol lozenges to help with this discomfort.  If it worsens or if you develop a fever, contact your surgeon.     If you feel your pain is not well managed with the pain medications prescribed by your surgeon, please contact your surgeon's office to let them know so they can address your concerns.     Today you received Toradol, an antiinflammatory medication similar  to Ibuprofen.  You should not take other antiinflammatory medication, such as Ibuprofen, Motrin, Advil, Aleve, Naprosyn, etc until 6:40    Today you were given 975 mg of Tylenol at 8:45 am. The recommended daily maximum dose is 4000 mg.       Hysterectomy instructions (Dr. Martines): Please call the office to schedule a post-op exam at 2 weeks, and a final exam at 6 weeks, post-op.  Please call or return sooner if you have: fever, chills, worsening abdominal pain, heavy vaginal bleeding, chest pain, palpitations, shortness of breath, or any other major concerns.  Do not lift anything >25 lb, and you must be on pelvic rest (no intercourse, tampons, douching, etc.) for 6 weeks until your final exam.  You may take ibuprofen 600 mg every 6 hours, tylenol 500-650 mg every 6 hours and oxycodone 5 mg every 6 hours as needed for pain.  If you require oxycodone, you may need to take a stool softer 1-2 times per day to prevent constipation.

## 2023-11-10 NOTE — OR NURSING
Patient barely tolerate 400 ml in the bladder. Able to get up and void. Post residual voiding 29cc

## 2023-11-10 NOTE — ANESTHESIA CARE TRANSFER NOTE
Patient: Chela Gleason    Procedure: Procedure(s):  total laparoscopic hysterectomy bilateral salpingectomy  retropubic midurethral sling and cystoscopy       Diagnosis: Uterine leiomyoma, unspecified location [D25.9]  Female stress incontinence [N39.3]  Endometriosis [N80.9]  Vaginal pain [R10.2]  Diagnosis Additional Information: No value filed.    Anesthesia Type:   General     Note:    Oropharynx: spontaneously breathing and oropharynx clear of all foreign objects  Level of Consciousness: awake  Oxygen Supplementation: face mask  Level of Supplemental Oxygen (L/min / FiO2): 6  Independent Airway: airway patency satisfactory and stable  Dentition: dentition unchanged  Vital Signs Stable: post-procedure vital signs reviewed and stable  Report to RN Given: handoff report given  Patient transferred to: PACU    Handoff Report: Identifed the Patient, Identified the Reponsible Provider, Reviewed the pertinent medical history, Discussed the surgical course, Reviewed Intra-OP anesthesia mangement and issues during anesthesia, Set expectations for post-procedure period and Allowed opportunity for questions and acknowledgement of understanding      Vitals:  Vitals Value Taken Time   /69 11/10/23 1255   Temp 36.8    Pulse 71 11/10/23 1258   Resp 14 11/10/23 1258   SpO2 99 % 11/10/23 1258   Vitals shown include unfiled device data.    Electronically Signed By: GUERRERO Roblero CRNA  November 10, 2023  12:59 PM

## 2023-11-10 NOTE — ANESTHESIA PREPROCEDURE EVALUATION
Anesthesia Pre-Procedure Evaluation    Patient: Chela Gleason   MRN: [id number] : 1975        Procedure : Procedure(s):  total laparoscopic hysterectomy bilateral salpingectomy  retropubic midurethral sling and cystoscopy          No past medical history on file.   No past surgical history on file.   Allergies   Allergen Reactions    Amoxicillin Hives and Rash     Other reaction(s): Unspecified      Social History     Tobacco Use    Smoking status: Not on file    Smokeless tobacco: Not on file   Substance Use Topics    Alcohol use: Not on file      Wt Readings from Last 1 Encounters:   No data found for Wt        Anesthesia Evaluation            ROS/MED HX  ENT/Pulmonary:    (-) tobacco use, asthma and sleep apnea   Neurologic:    (-) no seizures, no CVA and migraines   Cardiovascular:    (-) hypertension, CHF, arrhythmias, irregular heartbeat/palpitations and dyslipidemia   METS/Exercise Tolerance:     Hematologic:       Musculoskeletal:       GI/Hepatic:    (-) GERD and liver disease   Renal/Genitourinary:    (-) renal disease   Endo:    (-) Type II DM and thyroid disease   Psychiatric/Substance Use:    (-) alcohol abuse history   Infectious Disease:       Malignancy:       Other:            Physical Exam    Airway        Mallampati: II   TM distance: > 3 FB   Neck ROM: full   Mouth opening: > 3 cm    Respiratory Devices and Support         Dental  no notable dental history         Cardiovascular   cardiovascular exam normal          Pulmonary   pulmonary exam normal        breath sounds clear to auscultation           OUTSIDE LABS:  CBC:   Lab Results   Component Value Date    WBC 12.6 (H) 2023    HGB 12.1 2023    HCT 37.1 2023     2023     BMP:   Lab Results   Component Value Date     (L) 2023    POTASSIUM 4.0 2023    CHLORIDE 99 2023    CO2 21 (L) 2023    BUN 4.4 (L) 2023    CR 0.62 2023     (H) 2023     COAGS: No  "results found for: \"PTT\", \"INR\", \"FIBR\"  POC: No results found for: \"BGM\", \"HCG\", \"HCGS\"  HEPATIC:   Lab Results   Component Value Date    ALBUMIN 3.9 06/08/2023    PROTTOTAL 7.3 06/08/2023    ALT 17 06/08/2023    AST 19 06/08/2023    ALKPHOS 76 06/08/2023    BILITOTAL 0.3 06/08/2023     OTHER:   Lab Results   Component Value Date    REMINGTON 8.6 06/08/2023       Anesthesia Plan    ASA Status:  1    NPO Status:  NPO Appropriate    Anesthesia Type: General.     - Airway: ETT   Induction: Intravenous.   Maintenance: Balanced.        Consents    Anesthesia Plan(s) and associated risks, benefits, and realistic alternatives discussed. Questions answered and patient/representative(s) expressed understanding.     - Discussed:     - Discussed with:  Patient      - Extended Intubation/Ventilatory Support Discussed: No.      - Patient is DNR/DNI Status: No     Use of blood products discussed: Yes.     - Discussed with: Patient.     - Consented: consented to blood products            Reason for refusal: other.     Postoperative Care    Pain management: IV analgesics, Oral pain medications, Multi-modal analgesia.   PONV prophylaxis: Ondansetron (or other 5HT-3), Dexamethasone or Solumedrol     Comments:                Valdemar Land, DO  "

## 2023-11-14 LAB
PATH REPORT.COMMENTS IMP SPEC: NORMAL
PATH REPORT.FINAL DX SPEC: NORMAL
PATH REPORT.FINAL DX SPEC: NORMAL
PATH REPORT.GROSS SPEC: NORMAL
PATH REPORT.GROSS SPEC: NORMAL
PATH REPORT.MICROSCOPIC SPEC OTHER STN: NORMAL
PATH REPORT.MICROSCOPIC SPEC OTHER STN: NORMAL
PATH REPORT.RELEVANT HX SPEC: NORMAL
PHOTO IMAGE: NORMAL

## 2023-11-22 NOTE — OP NOTE
OPERATIVE NOTE      DATE OF PROCEDURE: 11/10/2023    PRE-OP DIAGNOSES:  Uterine leiomyoma  Endometriosis  Pelvic pain  Stress urinary incontinence    POST-OP DIAGNOSES:  Uterine leiomyoma  Endometriosis  Pelvic pain  Stress urinary incontinence    PROCEDURE:  Single incision total laparoscopic hysterectomy  Bilateral salpingectomy  Retropubic mid-urethral sling and cystoscopy performed by Dr. Moise (See separate operative report)    SURGEON:  Primary: Robles Martines MD  Assistant: Heather Couch MD    ANESTHESIA: General    IV FLUIDS: Please see MAR    ESTIMATED BLOOD LOSS: <50 ml    SPECIMEN AND DISPOSITION: Uterus, cervix, bilateral fallopian tubes, and pelvic washings sent to pathology and cytology    COMPLICATIONS: none    FINDINGS: Normal sized uterus with multiple subserosal fibroids, small.  Cystic appearing implants diffusely on the uterine serosa, suspicious for endometriosis.  Myometrial defect vs cyst noted in the right cornual of the uterus. Normal appearing BL fallopian tubes and ovaries.    INDICATIONS AND CONSENT:  The patient is a 47 yo female with a longstanding history of uterine fibroids, with previous myomectomy and uterine artery embolization, and endometriosis with worsening pelvic pain.  Decision made to proceed with definitive surgery - total laparoscopic hysterectomy with bilateral salpingectomy - with combined sling procedure due to IKE.  The risks/benefits of the procedure were discussed with the patient, including risks of vascular injury with possible need for transfusion, visceral injury including but not limited to damage to bowel, bladder, or ureter, infection, prolonged hospitalization, and possible reoperation.  The patient understood and agreed, all questions were answered and consents were signed.    PROCEDURE:  The patient was taken to the operating room where anesthesia was administered without difficulty.  She was prepped and draped in a dorsal lithotomy position with  Yellowfin stirrups.  A speculum was placed in the vagina and the cervix was grasped with a single toothed tenaculum. The cervix was gently dilated to accommodate the medium sized V-care device. This was inserted without difficulty, the balloon was inflated, and the V-care cup was then placed flush with the cervix.  Tenaculum and speculum were removed.  A antonio catheter was placed to drain the bladder.  Attention was then paid to the abdomen.    Using a scalpel, an incision was made in the infraumbilical fold and carried down to the level of the fascia with blunt and sharp dissection.  The fascia was tagged and elevated with 0-vicryl suture, and entered sharply with Razo scissors.  The peritoneum was entered bluntly.  The Olympus triport plus was inserted and pneumoperitoneum was achieved with CO2 gas to a level of 15 mmHg.  The Endoeye laparoscope was then introduced and laparoscopic findings revealed what is noted above, including diffuse cystic implants along the uterine serosa. This was suspected to be endometriosis implants, however decision was made to collect pelvic washings. The pelvis was irrigated, and fluid was subsequently suctioned to be sent to cytology.    Using an atraumatic grasper, the left cornua was grasped for traction.  The Thunderbeat was then used to clamp, coagulate, and cut the left round ligament.  The left uteroovarian ligament was then clamped, coagulated and cut.  The anterior and posterior leaves of the broad ligament were dissected, coagulated, and cut using the Thunderbeat. A bladder flap was created from the vesicoperitoneum and the bladder was gently dissected off of the lower uterine segment and cervix. The left uterine artery was skeletonized, then clamped, doubly coagulated, and cut with attention paid to remain flush against the uterus and cervix.  This process was then repeated on the right side.  The uterus was noted to be blanched at this point with excellent hemostasis.  Of  note, upward traction was maintained on the V-care cup during this process.    The V-care cup edge was identified and using the Thunderbeat device, colpotomy was performed against the cup without difficulty, with constant upward traction maintained on the V-care device. The uterus with cervix were then removed through the vagina to be sent to pathology.  A glove with sponges was placed in the vagina to maintain pneumoperitoneum.  The vaginal cuff was inspected and found to be hemostatic.  Using the Endostitch, the vaginal cuff was closed with 0-Vicryl suture in an interrupted fashion.  Reinspection revealed excellent hemostasis.     Using the grasper, the distal end of the left fallopian tube was grasped and elevated away from the pelvic sidewall. Using the Thunderbeat, the left fallopian tube was clamped, coagulated, and cut and removed through the triport to be sent to pathology. This was repeated with the right fallopian tube, also sent to pathology.  Reinspection revealed excellent hemostasis.  Pneumoperitoneum was partially released, and excellent hemostasis was still noted in the pelvis.  Pneumoperitoneum was fully released and all instruments were removed from the abdomen.  The umbilical fascial incision was closed with 0-vicryl suture in a continuous running fashion.  The skin was then closed with 4-0 Monocryl in a subcuticular fashion.  Sponge was removed from the vagina at the end of the procedure.  Dr. Moise was then present to perform her portion of the procedure.    All sponge and instrument counts were correct x 2.      GISELLE CAMPBELL MD     show

## 2024-04-19 ENCOUNTER — LAB REQUISITION (OUTPATIENT)
Dept: LAB | Facility: CLINIC | Age: 49
End: 2024-04-19
Payer: COMMERCIAL

## 2024-04-19 DIAGNOSIS — S60.940A UNSPECIFIED SUPERFICIAL INJURY OF RIGHT INDEX FINGER, INITIAL ENCOUNTER: ICD-10-CM

## 2024-04-19 PROCEDURE — 88342 IMHCHEM/IMCYTCHM 1ST ANTB: CPT | Mod: 26 | Performed by: PATHOLOGY

## 2024-04-19 PROCEDURE — 88342 IMHCHEM/IMCYTCHM 1ST ANTB: CPT | Mod: TC,ORL | Performed by: ORTHOPAEDIC SURGERY

## 2024-04-19 PROCEDURE — 88341 IMHCHEM/IMCYTCHM EA ADD ANTB: CPT | Mod: 26 | Performed by: PATHOLOGY

## 2024-04-19 PROCEDURE — 88305 TISSUE EXAM BY PATHOLOGIST: CPT | Mod: 26 | Performed by: PATHOLOGY

## 2024-04-23 LAB
PATH REPORT.COMMENTS IMP SPEC: NORMAL
PATH REPORT.COMMENTS IMP SPEC: NORMAL
PATH REPORT.FINAL DX SPEC: NORMAL
PATH REPORT.GROSS SPEC: NORMAL
PATH REPORT.MICROSCOPIC SPEC OTHER STN: NORMAL
PATH REPORT.RELEVANT HX SPEC: NORMAL
PHOTO IMAGE: NORMAL

## 2024-08-09 ENCOUNTER — ANCILLARY PROCEDURE (OUTPATIENT)
Dept: MAMMOGRAPHY | Facility: CLINIC | Age: 49
End: 2024-08-09
Payer: COMMERCIAL

## 2024-08-09 DIAGNOSIS — Z12.31 VISIT FOR SCREENING MAMMOGRAM: ICD-10-CM

## 2024-08-09 PROCEDURE — 77067 SCR MAMMO BI INCL CAD: CPT | Mod: TC | Performed by: RADIOLOGY

## 2024-08-09 PROCEDURE — 77063 BREAST TOMOSYNTHESIS BI: CPT | Mod: TC | Performed by: RADIOLOGY

## 2024-10-05 ENCOUNTER — HEALTH MAINTENANCE LETTER (OUTPATIENT)
Age: 49
End: 2024-10-05

## 2025-03-14 NOTE — TELEPHONE ENCOUNTER
Nursing: The patient was referred to hepatology (Dr. Jimbo Pickard) due to an indeterminate liver lesion. Please feel free to send records regarding the patient's recent office visit/any imaging as requested. ,[email or web address]

## (undated) DEVICE — SPONGE PACK VAGINAL 2"X9

## (undated) DEVICE — NDL 25GA 1.5" 305127

## (undated) DEVICE — GLOVE PROTEXIS BLUE W/NEU-THERA 6.5  2D73EB65

## (undated) DEVICE — SU VICRYL 0 CT-1 CR 8X27" UND JJ41G

## (undated) DEVICE — SOL WATER IRRIG 1000ML BOTTLE 07139-09

## (undated) DEVICE — SU VICRYL 0 CT-1 36" J346H

## (undated) DEVICE — SU DERMABOND ADVANCED .7ML DNX12

## (undated) DEVICE — SU VICRYL 0 UR-6 27" J603H

## (undated) DEVICE — TUBING IRRIG TUR Y TYPE 96" LF 6543-01

## (undated) DEVICE — ESU HOLDER LAP INST DISP PURPLE LONG 330MM H-PRO-330

## (undated) DEVICE — BLADE KNIFE SURG 15 371115

## (undated) DEVICE — EVAC SYSTEM CLEAR FLOW SC082500

## (undated) DEVICE — Device

## (undated) DEVICE — NDL COUNTER 10CT

## (undated) DEVICE — DECANTER VIAL 2006S

## (undated) DEVICE — ESU GROUND PAD UNIVERSAL W/O CORD

## (undated) DEVICE — SU MONOCRYL 4-0 PS-2 18" UND Y496G

## (undated) DEVICE — CATH FOLEY 16FR 30ML LATEX 0166SI16

## (undated) DEVICE — ENDO TROCAR SLEEVE KII ADV FIXATION 05X100MM CFS02

## (undated) DEVICE — DEVICE ENDO STITCH APPLIER 10MM 173016

## (undated) DEVICE — ESU HANDPIECE BIPOLAR THUNDERBEAT FC 5MMX35CM TB-0535FC

## (undated) DEVICE — TUBING SUCTION 12"X1/4" N612

## (undated) DEVICE — CATH TRAY FOLEY SURESTEP 16FR WDRAIN BAG STLK LATEX A300316A

## (undated) DEVICE — SOL NACL 0.9% INJ 1000ML BAG 2B1324X

## (undated) DEVICE — ESU CORD MONOPOLAR 10'  E0510

## (undated) DEVICE — ESU ELEC BLADE NN-STCK PLUMEPEN PRO 360D 10FT PLPRO4020

## (undated) DEVICE — SOL NACL 0.9% IRRIG 1000ML BOTTLE 2F7124

## (undated) DEVICE — SPONGE LAP 04X18" 23250-407

## (undated) DEVICE — SYR 10ML FINGER CONTROL W/O NDL 309695

## (undated) DEVICE — SOL WATER IRRIG 3000ML BAG 2B7117

## (undated) DEVICE — DRAPE VAGI BAG 18X9" 1072

## (undated) DEVICE — DRAPE MAYO STAND 23X54 8337

## (undated) DEVICE — DECANTER BAG 2002S

## (undated) DEVICE — LINEN TOWEL PACK X5 5464

## (undated) DEVICE — SYR 05ML SLIP TIP W/O NDL 309647

## (undated) DEVICE — ESU GROUND PAD ADULT W/CORD E7507

## (undated) DEVICE — NDL INSUFFLATION 13GA 120MM C2201

## (undated) DEVICE — RETR ELEV / UTERINE MANIPULATOR V-CARE MED CUP 60-6085-201A

## (undated) DEVICE — ENDO TROCAR FIRST ENTRY KII FIOS Z-THRD 05X100MM CTF03

## (undated) DEVICE — SU DERMABOND MINI DHVM12

## (undated) DEVICE — ENDO SCOPE WARMER LF TM500

## (undated) DEVICE — SU ENDO STITCH POLYSORB 0 48" 170052

## (undated) DEVICE — CATH TRAY FOLEY 16FR BARDEX W/DRAIN BAG STATLOCK 300316A

## (undated) DEVICE — SUCTION IRR STRYKERFLOW II W/TIP 250-070-520

## (undated) DEVICE — TROCAR TRIPORT PLUS OLYMPUS SINGLE ACCESS WA58010T

## (undated) DEVICE — NDL SPINAL 18GA 3.5" 405184

## (undated) DEVICE — SOL WATER IRRIG 1000ML BOTTLE 2F7114

## (undated) DEVICE — TRAP ASPIRATING LUKI 8ML LATEX

## (undated) DEVICE — TUBING IRRIG CYSTO/BLADDER SET 81" LF 2C4040

## (undated) DEVICE — CATH TRAY FOLEY SURESTEP 16FR W/URNE MTR STLK LATEX A303316A

## (undated) RX ORDER — HYDROMORPHONE HCL IN WATER/PF 6 MG/30 ML
PATIENT CONTROLLED ANALGESIA SYRINGE INTRAVENOUS
Status: DISPENSED
Start: 2023-11-10

## (undated) RX ORDER — FENTANYL CITRATE 0.05 MG/ML
INJECTION, SOLUTION INTRAMUSCULAR; INTRAVENOUS
Status: DISPENSED
Start: 2023-11-10

## (undated) RX ORDER — PROPOFOL 10 MG/ML
INJECTION, EMULSION INTRAVENOUS
Status: DISPENSED
Start: 2023-11-10

## (undated) RX ORDER — KETOROLAC TROMETHAMINE 30 MG/ML
INJECTION, SOLUTION INTRAMUSCULAR; INTRAVENOUS
Status: DISPENSED
Start: 2023-11-10

## (undated) RX ORDER — OXYCODONE HYDROCHLORIDE 5 MG/1
TABLET ORAL
Status: DISPENSED
Start: 2023-11-10

## (undated) RX ORDER — APREPITANT 40 MG/1
CAPSULE ORAL
Status: DISPENSED
Start: 2023-11-10

## (undated) RX ORDER — ONDANSETRON 2 MG/ML
INJECTION INTRAMUSCULAR; INTRAVENOUS
Status: DISPENSED
Start: 2023-11-10

## (undated) RX ORDER — HYDROXYZINE HYDROCHLORIDE 25 MG/1
TABLET, FILM COATED ORAL
Status: DISPENSED
Start: 2023-11-10

## (undated) RX ORDER — DEXAMETHASONE SODIUM PHOSPHATE 4 MG/ML
INJECTION, SOLUTION INTRA-ARTICULAR; INTRALESIONAL; INTRAMUSCULAR; INTRAVENOUS; SOFT TISSUE
Status: DISPENSED
Start: 2023-11-10

## (undated) RX ORDER — ACETAMINOPHEN 325 MG/1
TABLET ORAL
Status: DISPENSED
Start: 2023-11-10

## (undated) RX ORDER — FENTANYL CITRATE 50 UG/ML
INJECTION, SOLUTION INTRAMUSCULAR; INTRAVENOUS
Status: DISPENSED
Start: 2023-11-10

## (undated) RX ORDER — HYDROMORPHONE HYDROCHLORIDE 1 MG/ML
INJECTION, SOLUTION INTRAMUSCULAR; INTRAVENOUS; SUBCUTANEOUS
Status: DISPENSED
Start: 2023-11-10

## (undated) NOTE — ED AVS SNAPSHOT
[facility] in [address] [place] [address]    Phone:  [phone]    Fax:  [phone]           Sheng Gudino   MRN: [id number]    Department:  [facility] in [facility]   Date of Visit:  3 Discharge References/Attachments     BRONCHITIS, ANTIOBIOTIC TREATMENT (ADULT) (ENGLISH)      Disclosure     Insurance plans vary and the physician(s) referred by the Immediate Care may not be covered by your plan.   It is possible that the physician may no IF THERE IS ANY CHANGE OR WORSENING OF YOUR CONDITION, CALL YOUR PRIMARY CARE PHYSICIAN AT ONCE OR GO TO THE EMERGENCY DEPARTMENT.     If you have been prescribed any medication(s), please fill your prescription right away and begin taking the medication(s) you to explore options for quitting.     - If you have concerns related to behavioral health issues or thoughts of harming yourself, contact [facility] at [phone].     - If you don’t have insurance, Jennie Roger

## (undated) NOTE — ED AVS SNAPSHOT
Belen Perales   MRN: [id number]    Department:  [facility] Emergency Department   Date of Visit:  5/15/2019           Disclosure     Insurance plans vary and the physician(s) referred by the ER may not be covered by your plan.  Please con CARE PHYSICIAN AT ONCE OR RETURN IMMEDIATELY TO THE EMERGENCY DEPARTMENT. If you have been prescribed any medication(s), please fill your prescription right away and begin taking the medication(s) as directed.   If you believe that any of the medications